# Patient Record
Sex: FEMALE | Race: BLACK OR AFRICAN AMERICAN | NOT HISPANIC OR LATINO | URBAN - METROPOLITAN AREA
[De-identification: names, ages, dates, MRNs, and addresses within clinical notes are randomized per-mention and may not be internally consistent; named-entity substitution may affect disease eponyms.]

---

## 2023-11-01 ENCOUNTER — OUTPATIENT (OUTPATIENT)
Dept: OUTPATIENT SERVICES | Facility: HOSPITAL | Age: 38
LOS: 1 days | End: 2023-11-01
Payer: MEDICAID

## 2023-11-01 VITALS
HEART RATE: 93 BPM | TEMPERATURE: 99 F | OXYGEN SATURATION: 97 % | RESPIRATION RATE: 18 BRPM | DIASTOLIC BLOOD PRESSURE: 71 MMHG | SYSTOLIC BLOOD PRESSURE: 124 MMHG

## 2023-11-01 VITALS — SYSTOLIC BLOOD PRESSURE: 118 MMHG | DIASTOLIC BLOOD PRESSURE: 70 MMHG | HEART RATE: 76 BPM

## 2023-11-01 DIAGNOSIS — O26.899 OTHER SPECIFIED PREGNANCY RELATED CONDITIONS, UNSPECIFIED TRIMESTER: ICD-10-CM

## 2023-11-01 DIAGNOSIS — Z98.890 OTHER SPECIFIED POSTPROCEDURAL STATES: Chronic | ICD-10-CM

## 2023-11-01 LAB
ALBUMIN SERPL ELPH-MCNC: 3.6 G/DL — SIGNIFICANT CHANGE UP (ref 3.3–5)
ALBUMIN SERPL ELPH-MCNC: 3.6 G/DL — SIGNIFICANT CHANGE UP (ref 3.3–5)
ALP SERPL-CCNC: 135 U/L — HIGH (ref 40–120)
ALP SERPL-CCNC: 135 U/L — HIGH (ref 40–120)
ALT FLD-CCNC: 30 U/L — SIGNIFICANT CHANGE UP (ref 10–45)
ALT FLD-CCNC: 30 U/L — SIGNIFICANT CHANGE UP (ref 10–45)
ANION GAP SERPL CALC-SCNC: 13 MMOL/L — SIGNIFICANT CHANGE UP (ref 5–17)
ANION GAP SERPL CALC-SCNC: 13 MMOL/L — SIGNIFICANT CHANGE UP (ref 5–17)
APPEARANCE UR: CLEAR — SIGNIFICANT CHANGE UP
APPEARANCE UR: CLEAR — SIGNIFICANT CHANGE UP
AST SERPL-CCNC: 20 U/L — SIGNIFICANT CHANGE UP (ref 10–40)
AST SERPL-CCNC: 20 U/L — SIGNIFICANT CHANGE UP (ref 10–40)
BACTERIA # UR AUTO: NEGATIVE /HPF — SIGNIFICANT CHANGE UP
BACTERIA # UR AUTO: NEGATIVE /HPF — SIGNIFICANT CHANGE UP
BASOPHILS # BLD AUTO: 0.03 K/UL — SIGNIFICANT CHANGE UP (ref 0–0.2)
BASOPHILS # BLD AUTO: 0.03 K/UL — SIGNIFICANT CHANGE UP (ref 0–0.2)
BASOPHILS NFR BLD AUTO: 0.2 % — SIGNIFICANT CHANGE UP (ref 0–2)
BASOPHILS NFR BLD AUTO: 0.2 % — SIGNIFICANT CHANGE UP (ref 0–2)
BILIRUB SERPL-MCNC: <0.1 MG/DL — LOW (ref 0.2–1.2)
BILIRUB SERPL-MCNC: <0.1 MG/DL — LOW (ref 0.2–1.2)
BILIRUB UR-MCNC: NEGATIVE — SIGNIFICANT CHANGE UP
BILIRUB UR-MCNC: NEGATIVE — SIGNIFICANT CHANGE UP
BLD GP AB SCN SERPL QL: NEGATIVE — SIGNIFICANT CHANGE UP
BLD GP AB SCN SERPL QL: NEGATIVE — SIGNIFICANT CHANGE UP
BUN SERPL-MCNC: 6 MG/DL — LOW (ref 7–23)
BUN SERPL-MCNC: 6 MG/DL — LOW (ref 7–23)
CALCIUM SERPL-MCNC: 8.8 MG/DL — SIGNIFICANT CHANGE UP (ref 8.4–10.5)
CALCIUM SERPL-MCNC: 8.8 MG/DL — SIGNIFICANT CHANGE UP (ref 8.4–10.5)
CAST: 0 /LPF — SIGNIFICANT CHANGE UP (ref 0–4)
CAST: 0 /LPF — SIGNIFICANT CHANGE UP (ref 0–4)
CHLORIDE SERPL-SCNC: 107 MMOL/L — SIGNIFICANT CHANGE UP (ref 96–108)
CHLORIDE SERPL-SCNC: 107 MMOL/L — SIGNIFICANT CHANGE UP (ref 96–108)
CO2 SERPL-SCNC: 17 MMOL/L — LOW (ref 22–31)
CO2 SERPL-SCNC: 17 MMOL/L — LOW (ref 22–31)
COLOR SPEC: YELLOW — SIGNIFICANT CHANGE UP
COLOR SPEC: YELLOW — SIGNIFICANT CHANGE UP
CREAT SERPL-MCNC: 0.48 MG/DL — LOW (ref 0.5–1.3)
CREAT SERPL-MCNC: 0.48 MG/DL — LOW (ref 0.5–1.3)
DIFF PNL FLD: NEGATIVE — SIGNIFICANT CHANGE UP
DIFF PNL FLD: NEGATIVE — SIGNIFICANT CHANGE UP
EGFR: 125 ML/MIN/1.73M2 — SIGNIFICANT CHANGE UP
EGFR: 125 ML/MIN/1.73M2 — SIGNIFICANT CHANGE UP
EOSINOPHIL # BLD AUTO: 0.02 K/UL — SIGNIFICANT CHANGE UP (ref 0–0.5)
EOSINOPHIL # BLD AUTO: 0.02 K/UL — SIGNIFICANT CHANGE UP (ref 0–0.5)
EOSINOPHIL NFR BLD AUTO: 0.1 % — SIGNIFICANT CHANGE UP (ref 0–6)
EOSINOPHIL NFR BLD AUTO: 0.1 % — SIGNIFICANT CHANGE UP (ref 0–6)
GLUCOSE SERPL-MCNC: 68 MG/DL — LOW (ref 70–99)
GLUCOSE SERPL-MCNC: 68 MG/DL — LOW (ref 70–99)
GLUCOSE UR QL: NEGATIVE MG/DL — SIGNIFICANT CHANGE UP
GLUCOSE UR QL: NEGATIVE MG/DL — SIGNIFICANT CHANGE UP
HCT VFR BLD CALC: 37.6 % — SIGNIFICANT CHANGE UP (ref 34.5–45)
HCT VFR BLD CALC: 37.6 % — SIGNIFICANT CHANGE UP (ref 34.5–45)
HGB BLD-MCNC: 12.7 G/DL — SIGNIFICANT CHANGE UP (ref 11.5–15.5)
HGB BLD-MCNC: 12.7 G/DL — SIGNIFICANT CHANGE UP (ref 11.5–15.5)
IMM GRANULOCYTES NFR BLD AUTO: 1.8 % — HIGH (ref 0–0.9)
IMM GRANULOCYTES NFR BLD AUTO: 1.8 % — HIGH (ref 0–0.9)
KETONES UR-MCNC: NEGATIVE MG/DL — SIGNIFICANT CHANGE UP
KETONES UR-MCNC: NEGATIVE MG/DL — SIGNIFICANT CHANGE UP
LEUKOCYTE ESTERASE UR-ACNC: ABNORMAL
LEUKOCYTE ESTERASE UR-ACNC: ABNORMAL
LYMPHOCYTES # BLD AUTO: 1.98 K/UL — SIGNIFICANT CHANGE UP (ref 1–3.3)
LYMPHOCYTES # BLD AUTO: 1.98 K/UL — SIGNIFICANT CHANGE UP (ref 1–3.3)
LYMPHOCYTES # BLD AUTO: 14.8 % — SIGNIFICANT CHANGE UP (ref 13–44)
LYMPHOCYTES # BLD AUTO: 14.8 % — SIGNIFICANT CHANGE UP (ref 13–44)
MCHC RBC-ENTMCNC: 26.7 PG — LOW (ref 27–34)
MCHC RBC-ENTMCNC: 26.7 PG — LOW (ref 27–34)
MCHC RBC-ENTMCNC: 33.8 GM/DL — SIGNIFICANT CHANGE UP (ref 32–36)
MCHC RBC-ENTMCNC: 33.8 GM/DL — SIGNIFICANT CHANGE UP (ref 32–36)
MCV RBC AUTO: 79.2 FL — LOW (ref 80–100)
MCV RBC AUTO: 79.2 FL — LOW (ref 80–100)
MONOCYTES # BLD AUTO: 0.9 K/UL — SIGNIFICANT CHANGE UP (ref 0–0.9)
MONOCYTES # BLD AUTO: 0.9 K/UL — SIGNIFICANT CHANGE UP (ref 0–0.9)
MONOCYTES NFR BLD AUTO: 6.7 % — SIGNIFICANT CHANGE UP (ref 2–14)
MONOCYTES NFR BLD AUTO: 6.7 % — SIGNIFICANT CHANGE UP (ref 2–14)
NEUTROPHILS # BLD AUTO: 10.19 K/UL — HIGH (ref 1.8–7.4)
NEUTROPHILS # BLD AUTO: 10.19 K/UL — HIGH (ref 1.8–7.4)
NEUTROPHILS NFR BLD AUTO: 76.4 % — SIGNIFICANT CHANGE UP (ref 43–77)
NEUTROPHILS NFR BLD AUTO: 76.4 % — SIGNIFICANT CHANGE UP (ref 43–77)
NITRITE UR-MCNC: NEGATIVE — SIGNIFICANT CHANGE UP
NITRITE UR-MCNC: NEGATIVE — SIGNIFICANT CHANGE UP
NRBC # BLD: 0 /100 WBCS — SIGNIFICANT CHANGE UP (ref 0–0)
NRBC # BLD: 0 /100 WBCS — SIGNIFICANT CHANGE UP (ref 0–0)
PH UR: 6.5 — SIGNIFICANT CHANGE UP (ref 5–8)
PH UR: 6.5 — SIGNIFICANT CHANGE UP (ref 5–8)
PLATELET # BLD AUTO: 232 K/UL — SIGNIFICANT CHANGE UP (ref 150–400)
PLATELET # BLD AUTO: 232 K/UL — SIGNIFICANT CHANGE UP (ref 150–400)
POTASSIUM SERPL-MCNC: 3.8 MMOL/L — SIGNIFICANT CHANGE UP (ref 3.5–5.3)
POTASSIUM SERPL-MCNC: 3.8 MMOL/L — SIGNIFICANT CHANGE UP (ref 3.5–5.3)
POTASSIUM SERPL-SCNC: 3.8 MMOL/L — SIGNIFICANT CHANGE UP (ref 3.5–5.3)
POTASSIUM SERPL-SCNC: 3.8 MMOL/L — SIGNIFICANT CHANGE UP (ref 3.5–5.3)
PROT SERPL-MCNC: 6.3 G/DL — SIGNIFICANT CHANGE UP (ref 6–8.3)
PROT SERPL-MCNC: 6.3 G/DL — SIGNIFICANT CHANGE UP (ref 6–8.3)
PROT UR-MCNC: NEGATIVE MG/DL — SIGNIFICANT CHANGE UP
PROT UR-MCNC: NEGATIVE MG/DL — SIGNIFICANT CHANGE UP
RBC # BLD: 4.75 M/UL — SIGNIFICANT CHANGE UP (ref 3.8–5.2)
RBC # BLD: 4.75 M/UL — SIGNIFICANT CHANGE UP (ref 3.8–5.2)
RBC # FLD: 15.6 % — HIGH (ref 10.3–14.5)
RBC # FLD: 15.6 % — HIGH (ref 10.3–14.5)
RBC CASTS # UR COMP ASSIST: 2 /HPF — SIGNIFICANT CHANGE UP (ref 0–4)
RBC CASTS # UR COMP ASSIST: 2 /HPF — SIGNIFICANT CHANGE UP (ref 0–4)
RH IG SCN BLD-IMP: POSITIVE — SIGNIFICANT CHANGE UP
SODIUM SERPL-SCNC: 137 MMOL/L — SIGNIFICANT CHANGE UP (ref 135–145)
SODIUM SERPL-SCNC: 137 MMOL/L — SIGNIFICANT CHANGE UP (ref 135–145)
SP GR SPEC: 1.01 — SIGNIFICANT CHANGE UP (ref 1–1.03)
SP GR SPEC: 1.01 — SIGNIFICANT CHANGE UP (ref 1–1.03)
SQUAMOUS # UR AUTO: 1 /HPF — SIGNIFICANT CHANGE UP (ref 0–5)
SQUAMOUS # UR AUTO: 1 /HPF — SIGNIFICANT CHANGE UP (ref 0–5)
UROBILINOGEN FLD QL: 0.2 MG/DL — SIGNIFICANT CHANGE UP (ref 0.2–1)
UROBILINOGEN FLD QL: 0.2 MG/DL — SIGNIFICANT CHANGE UP (ref 0.2–1)
WBC # BLD: 13.36 K/UL — HIGH (ref 3.8–10.5)
WBC # BLD: 13.36 K/UL — HIGH (ref 3.8–10.5)
WBC # FLD AUTO: 13.36 K/UL — HIGH (ref 3.8–10.5)
WBC # FLD AUTO: 13.36 K/UL — HIGH (ref 3.8–10.5)
WBC UR QL: 2 /HPF — SIGNIFICANT CHANGE UP (ref 0–5)
WBC UR QL: 2 /HPF — SIGNIFICANT CHANGE UP (ref 0–5)

## 2023-11-01 PROCEDURE — 87491 CHLMYD TRACH DNA AMP PROBE: CPT

## 2023-11-01 PROCEDURE — 86787 VARICELLA-ZOSTER ANTIBODY: CPT

## 2023-11-01 PROCEDURE — 81001 URINALYSIS AUTO W/SCOPE: CPT

## 2023-11-01 PROCEDURE — 87086 URINE CULTURE/COLONY COUNT: CPT

## 2023-11-01 PROCEDURE — G0463: CPT

## 2023-11-01 PROCEDURE — 80053 COMPREHEN METABOLIC PANEL: CPT

## 2023-11-01 PROCEDURE — 99223 1ST HOSP IP/OBS HIGH 75: CPT

## 2023-11-01 PROCEDURE — 85025 COMPLETE CBC W/AUTO DIFF WBC: CPT

## 2023-11-01 PROCEDURE — 86900 BLOOD TYPING SEROLOGIC ABO: CPT

## 2023-11-01 PROCEDURE — 86901 BLOOD TYPING SEROLOGIC RH(D): CPT

## 2023-11-01 PROCEDURE — 86850 RBC ANTIBODY SCREEN: CPT

## 2023-11-01 PROCEDURE — 87591 N.GONORRHOEAE DNA AMP PROB: CPT

## 2023-11-01 PROCEDURE — 87653 STREP B DNA AMP PROBE: CPT

## 2023-11-01 PROCEDURE — 86780 TREPONEMA PALLIDUM: CPT

## 2023-11-01 PROCEDURE — 87389 HIV-1 AG W/HIV-1&-2 AB AG IA: CPT

## 2023-11-01 NOTE — OB PROVIDER TRIAGE NOTE - NSHPPHYSICALEXAM_GEN_ALL_CORE
ICU Vital Signs Last 24 Hrs  T(C): 37.1 (01 Nov 2023 20:49), Max: 37.1 (01 Nov 2023 20:43)  T(F): 98.78 (01 Nov 2023 20:49), Max: 98.8 (01 Nov 2023 20:43)  HR: 84 (01 Nov 2023 21:52) (82 - 106)  BP: 124/71 (01 Nov 2023 20:49) (124/71 - 124/71)  BP(mean): --  ABP: --  ABP(mean): --  RR: 18 (01 Nov 2023 20:49) (18 - 18)  SpO2: 98% (01 Nov 2023 21:52) (96% - 99%)    O2 Parameters below as of 01 Nov 2023 20:43  Patient On (Oxygen Delivery Method): room air        Gen: NAD  Abd: soft, nontender  SSE: Cervix visually closed, no leakage or membranes or bleeding noted. Tompkins cerclage suture/knots visualized at 12 o'clock  TVUS: CL 3.2, cerclage visualized in cervical stroma   TAUS: Vertex, right lateral placenta, MVP 5, BPP 8/8

## 2023-11-01 NOTE — OB PROVIDER TRIAGE NOTE - NSOBPROVIDERNOTE_OBGYN_ALL_OB_FT
39yo  at 31w presenting for pelvic pressure and cramping with history-indicated Tompkins cerclage in situ. Cerclage noted to be in place, cervix long, no evidence of PTL at this time.    - prenatal labs drawn  - NST reactive, appropriate for gestational age, intermittent CTX noted  - Fetal and maternal status reassuring    Seen and d/w Dr. Ronnie Adame, PGY3

## 2023-11-01 NOTE — OB PROVIDER TRIAGE NOTE - HISTORY OF PRESENT ILLNESS
38y  at 31w presenting with cramping and pelvic pressure. Denies vaginal bleeding and loss of fluid. Normal fetal movement.    Patient has recently relocated from [place], where she had a history-indicated cerclage (Felicitas) placed at 14w. Pregnancy has been otherwise uncomplicated.     ObHx:    demise at 20+ weeks 2/2 painless cervical dilation s/p , required D&C for PPH, offered transfusion but refused     GynHx:   denies, cannot recall last pap results    MedHx: denies    SrgHx: D&C    PsychHx: denies    SocialHx: denies    AllergyHx: denies    RxHx: denies

## 2023-11-01 NOTE — OB PROVIDER TRIAGE NOTE - ATTENDING COMMENTS
ATTG note    Pt seen with and agree with above PGY3 note    37 yo  @ 31 wks with h/o hx-indicated cerclage now c/o pelvic pressure and cramping since today.  Denies LOF/VB.  PNC-started in [place], 14 wk cerclage placed for h/o 20 wk painless dilation and BBOW with IOL and , c/b retained POC and PPH  late to care in [facility] and started at [facility] with 2 visits  Desires to transfer care to [facility], has records with first trimester dating and anatomy u/s    VSS, afebrile  Gen-NAD  Reative NST  Irreg ctxs  SSE-os closed, stitch seen  CL-3.3 cm  BSUS-vtx, fundal plaenta, MVP-4, BPP-8/8    u/a - normal    37 yo  @ 31 wks with h/o cervical insuff and hx-indicated cerclage now for cramping.  No signs of PTL.  Cerclage in situ.  Reactive NST.  -clinically stable to dc to home  -Email and contact info for [address] [place] [address] to establish care and instructed to bring records  -Return precautions given    YISSEL Trujillo

## 2023-11-02 ENCOUNTER — NON-APPOINTMENT (OUTPATIENT)
Age: 38
End: 2023-11-02

## 2023-11-02 DIAGNOSIS — R10.2 PELVIC AND PERINEAL PAIN: ICD-10-CM

## 2023-11-02 DIAGNOSIS — Z87.59 PERSONAL HISTORY OF OTHER COMPLICATIONS OF PREGNANCY, CHILDBIRTH AND THE PUERPERIUM: ICD-10-CM

## 2023-11-02 DIAGNOSIS — Z3A.31 31 WEEKS GESTATION OF PREGNANCY: ICD-10-CM

## 2023-11-02 DIAGNOSIS — O09.523 SUPERVISION OF ELDERLY MULTIGRAVIDA, THIRD TRIMESTER: ICD-10-CM

## 2023-11-02 DIAGNOSIS — O26.893 OTHER SPECIFIED PREGNANCY RELATED CONDITIONS, THIRD TRIMESTER: ICD-10-CM

## 2023-11-02 DIAGNOSIS — O34.33 MATERNAL CARE FOR CERVICAL INCOMPETENCE, THIRD TRIMESTER: ICD-10-CM

## 2023-11-02 PROBLEM — Z00.00 ENCOUNTER FOR PREVENTIVE HEALTH EXAMINATION: Status: ACTIVE | Noted: 2023-11-02

## 2023-11-02 LAB
C TRACH RRNA SPEC QL NAA+PROBE: SIGNIFICANT CHANGE UP
C TRACH RRNA SPEC QL NAA+PROBE: SIGNIFICANT CHANGE UP
CULTURE RESULTS: SIGNIFICANT CHANGE UP
CULTURE RESULTS: SIGNIFICANT CHANGE UP
HIV 1+2 AB+HIV1 P24 AG SERPL QL IA: SIGNIFICANT CHANGE UP
HIV 1+2 AB+HIV1 P24 AG SERPL QL IA: SIGNIFICANT CHANGE UP
N GONORRHOEA RRNA SPEC QL NAA+PROBE: SIGNIFICANT CHANGE UP
N GONORRHOEA RRNA SPEC QL NAA+PROBE: SIGNIFICANT CHANGE UP
SPECIMEN SOURCE: SIGNIFICANT CHANGE UP
SPECIMEN SOURCE: SIGNIFICANT CHANGE UP
T PALLIDUM AB TITR SER: NEGATIVE — SIGNIFICANT CHANGE UP
T PALLIDUM AB TITR SER: NEGATIVE — SIGNIFICANT CHANGE UP
VZV IGG FLD QL IA: 950.9 INDEX — SIGNIFICANT CHANGE UP
VZV IGG FLD QL IA: 950.9 INDEX — SIGNIFICANT CHANGE UP
VZV IGG FLD QL IA: POSITIVE — SIGNIFICANT CHANGE UP
VZV IGG FLD QL IA: POSITIVE — SIGNIFICANT CHANGE UP

## 2023-11-03 DIAGNOSIS — Z82.49 FAMILY HISTORY OF ISCHEMIC HEART DISEASE AND OTHER DISEASES OF THE CIRCULATORY SYSTEM: ICD-10-CM

## 2023-11-03 DIAGNOSIS — Z78.9 OTHER SPECIFIED HEALTH STATUS: ICD-10-CM

## 2023-11-03 DIAGNOSIS — Z83.3 FAMILY HISTORY OF DIABETES MELLITUS: ICD-10-CM

## 2023-11-03 DIAGNOSIS — O09.299 SUPERVISION OF PREGNANCY WITH OTHER POOR REPRODUCTIVE OR OBSTETRIC HISTORY, UNSPECIFIED TRIMESTER: ICD-10-CM

## 2023-11-03 DIAGNOSIS — Z86.2 PERSONAL HISTORY OF DISEASES OF THE BLOOD AND BLOOD-FORMING ORGANS AND CERTAIN DISORDERS INVOLVING THE IMMUNE MECHANISM: ICD-10-CM

## 2023-11-03 DIAGNOSIS — O34.30 MATERNAL CARE FOR CERVICAL INCOMPETENCE, UNSPECIFIED TRIMESTER: ICD-10-CM

## 2023-11-03 DIAGNOSIS — Z80.42 FAMILY HISTORY OF MALIGNANT NEOPLASM OF PROSTATE: ICD-10-CM

## 2023-11-03 LAB
GROUP B BETA STREP DNA (PCR): DETECTED
GROUP B BETA STREP DNA (PCR): DETECTED
SOURCE GROUP B STREP: SIGNIFICANT CHANGE UP
SOURCE GROUP B STREP: SIGNIFICANT CHANGE UP

## 2023-11-08 ENCOUNTER — NON-APPOINTMENT (OUTPATIENT)
Age: 38
End: 2023-11-08

## 2023-11-14 ENCOUNTER — OUTPATIENT (OUTPATIENT)
Dept: OUTPATIENT SERVICES | Facility: HOSPITAL | Age: 38
LOS: 1 days | End: 2023-11-14
Payer: MEDICAID

## 2023-11-14 ENCOUNTER — APPOINTMENT (OUTPATIENT)
Dept: ANTEPARTUM | Facility: CLINIC | Age: 38
End: 2023-11-14
Payer: MEDICAID

## 2023-11-14 ENCOUNTER — APPOINTMENT (OUTPATIENT)
Dept: MATERNAL FETAL MEDICINE | Facility: CLINIC | Age: 38
End: 2023-11-14
Payer: MEDICAID

## 2023-11-14 ENCOUNTER — ASOB RESULT (OUTPATIENT)
Age: 38
End: 2023-11-14

## 2023-11-14 VITALS
WEIGHT: 157 LBS | SYSTOLIC BLOOD PRESSURE: 118 MMHG | HEART RATE: 96 BPM | OXYGEN SATURATION: 98 % | DIASTOLIC BLOOD PRESSURE: 62 MMHG

## 2023-11-14 DIAGNOSIS — O09.899 SUPERVISION OF OTHER HIGH RISK PREGNANCIES, UNSPECIFIED TRIMESTER: ICD-10-CM

## 2023-11-14 DIAGNOSIS — O99.619 DISEASES OF THE DIGESTIVE SYSTEM COMPLICATING PREGNANCY, UNSPECIFIED TRIMESTER: ICD-10-CM

## 2023-11-14 DIAGNOSIS — O09.90 SUPERVISION OF HIGH RISK PREGNANCY, UNSPECIFIED, UNSPECIFIED TRIMESTER: ICD-10-CM

## 2023-11-14 DIAGNOSIS — Z98.890 OTHER SPECIFIED POSTPROCEDURAL STATES: Chronic | ICD-10-CM

## 2023-11-14 DIAGNOSIS — K21.9 DISEASES OF THE DIGESTIVE SYSTEM COMPLICATING PREGNANCY, UNSPECIFIED TRIMESTER: ICD-10-CM

## 2023-11-14 LAB
BILIRUB UR QL STRIP: NORMAL
COLLECTION METHOD: NORMAL
GLUCOSE UR-MCNC: NORMAL
HCG UR QL: 0.2 EU/DL
HCT VFR BLD CALC: 44.2 % — SIGNIFICANT CHANGE UP (ref 34.5–45)
HCT VFR BLD CALC: 44.2 % — SIGNIFICANT CHANGE UP (ref 34.5–45)
HGB BLD-MCNC: 14.2 G/DL — SIGNIFICANT CHANGE UP (ref 11.5–15.5)
HGB BLD-MCNC: 14.2 G/DL — SIGNIFICANT CHANGE UP (ref 11.5–15.5)
HGB UR QL STRIP.AUTO: NORMAL
KETONES UR-MCNC: NORMAL
LEUKOCYTE ESTERASE UR QL STRIP: NORMAL
MCHC RBC-ENTMCNC: 26.6 PG — LOW (ref 27–34)
MCHC RBC-ENTMCNC: 26.6 PG — LOW (ref 27–34)
MCHC RBC-ENTMCNC: 32.1 GM/DL — SIGNIFICANT CHANGE UP (ref 32–36)
MCHC RBC-ENTMCNC: 32.1 GM/DL — SIGNIFICANT CHANGE UP (ref 32–36)
MCV RBC AUTO: 82.8 FL — SIGNIFICANT CHANGE UP (ref 80–100)
MCV RBC AUTO: 82.8 FL — SIGNIFICANT CHANGE UP (ref 80–100)
NITRITE UR QL STRIP: NORMAL
PH UR STRIP: 6
PLATELET # BLD AUTO: 244 K/UL — SIGNIFICANT CHANGE UP (ref 150–400)
PLATELET # BLD AUTO: 244 K/UL — SIGNIFICANT CHANGE UP (ref 150–400)
PROT UR STRIP-MCNC: NORMAL
RBC # BLD: 5.34 M/UL — HIGH (ref 3.8–5.2)
RBC # BLD: 5.34 M/UL — HIGH (ref 3.8–5.2)
RBC # FLD: 16.3 % — HIGH (ref 10.3–14.5)
RBC # FLD: 16.3 % — HIGH (ref 10.3–14.5)
SP GR UR STRIP: 1.02
TSH SERPL-MCNC: 1.96 UIU/ML — SIGNIFICANT CHANGE UP (ref 0.27–4.2)
TSH SERPL-MCNC: 1.96 UIU/ML — SIGNIFICANT CHANGE UP (ref 0.27–4.2)
WBC # BLD: 12.14 K/UL — HIGH (ref 3.8–10.5)
WBC # BLD: 12.14 K/UL — HIGH (ref 3.8–10.5)
WBC # FLD AUTO: 12.14 K/UL — HIGH (ref 3.8–10.5)
WBC # FLD AUTO: 12.14 K/UL — HIGH (ref 3.8–10.5)

## 2023-11-14 PROCEDURE — 85027 COMPLETE CBC AUTOMATED: CPT

## 2023-11-14 PROCEDURE — 36415 COLL VENOUS BLD VENIPUNCTURE: CPT

## 2023-11-14 PROCEDURE — 86850 RBC ANTIBODY SCREEN: CPT

## 2023-11-14 PROCEDURE — 86901 BLOOD TYPING SEROLOGIC RH(D): CPT

## 2023-11-14 PROCEDURE — 87389 HIV-1 AG W/HIV-1&-2 AB AG IA: CPT

## 2023-11-14 PROCEDURE — 76805 OB US >/= 14 WKS SNGL FETUS: CPT | Mod: 26

## 2023-11-14 PROCEDURE — G0463: CPT

## 2023-11-14 PROCEDURE — 86706 HEP B SURFACE ANTIBODY: CPT

## 2023-11-14 PROCEDURE — 99203 OFFICE O/P NEW LOW 30 MIN: CPT | Mod: TH,GE,25

## 2023-11-14 PROCEDURE — 86780 TREPONEMA PALLIDUM: CPT

## 2023-11-14 PROCEDURE — 87086 URINE CULTURE/COLONY COUNT: CPT

## 2023-11-14 PROCEDURE — 81003 URINALYSIS AUTO W/O SCOPE: CPT

## 2023-11-14 PROCEDURE — 86900 BLOOD TYPING SEROLOGIC ABO: CPT

## 2023-11-14 PROCEDURE — 84443 ASSAY THYROID STIM HORMONE: CPT

## 2023-11-15 ENCOUNTER — LABORATORY RESULT (OUTPATIENT)
Age: 38
End: 2023-11-15

## 2023-11-15 LAB
HBV SURFACE AB SER-ACNC: REACTIVE
HBV SURFACE AB SER-ACNC: REACTIVE
HIV 1+2 AB+HIV1 P24 AG SERPL QL IA: SIGNIFICANT CHANGE UP
HIV 1+2 AB+HIV1 P24 AG SERPL QL IA: SIGNIFICANT CHANGE UP
T PALLIDUM AB TITR SER: NEGATIVE — SIGNIFICANT CHANGE UP
T PALLIDUM AB TITR SER: NEGATIVE — SIGNIFICANT CHANGE UP

## 2023-11-16 ENCOUNTER — NON-APPOINTMENT (OUTPATIENT)
Age: 38
End: 2023-11-16

## 2023-11-16 PROBLEM — O99.619 GASTROESOPHAGEAL REFLUX DURING PREGNANCY, ANTEPARTUM: Status: ACTIVE | Noted: 2023-11-16

## 2023-11-16 PROBLEM — O09.90 HIGH RISK PREGNANCY, ANTEPARTUM: Status: ACTIVE | Noted: 2023-11-16

## 2023-11-16 LAB
CULTURE RESULTS: SIGNIFICANT CHANGE UP
CULTURE RESULTS: SIGNIFICANT CHANGE UP
SPECIMEN SOURCE: SIGNIFICANT CHANGE UP
SPECIMEN SOURCE: SIGNIFICANT CHANGE UP

## 2023-11-16 RX ORDER — PNV NO.95/FERROUS FUM/FOLIC AC 28MG-0.8MG
28-0.8 TABLET ORAL
Qty: 100 | Refills: 1 | Status: ACTIVE | COMMUNITY
Start: 2023-11-16 | End: 1900-01-01

## 2023-11-16 RX ORDER — FAMOTIDINE 20 MG/1
20 TABLET, FILM COATED ORAL
Qty: 1 | Refills: 1 | Status: ACTIVE | COMMUNITY
Start: 2023-11-16 | End: 1900-01-01

## 2023-11-20 ENCOUNTER — NON-APPOINTMENT (OUTPATIENT)
Age: 38
End: 2023-11-20

## 2023-11-20 DIAGNOSIS — Z22.330 CARRIER OF GROUP B STREPTOCOCCUS: ICD-10-CM

## 2023-11-20 RX ORDER — CALCIUM CARBONATE/VITAMIN D3 600MG-5MCG
600-5 TABLET ORAL
Qty: 50 | Refills: 1 | Status: ACTIVE | COMMUNITY
Start: 2023-11-20 | End: 1900-01-01

## 2023-11-23 ENCOUNTER — INPATIENT (INPATIENT)
Facility: HOSPITAL | Age: 38
LOS: 1 days | Discharge: HOME | End: 2023-11-25
Attending: OBSTETRICS & GYNECOLOGY | Admitting: OBSTETRICS & GYNECOLOGY
Payer: MEDICAID

## 2023-11-23 VITALS
OXYGEN SATURATION: 98 % | RESPIRATION RATE: 18 BRPM | SYSTOLIC BLOOD PRESSURE: 133 MMHG | TEMPERATURE: 99 F | DIASTOLIC BLOOD PRESSURE: 87 MMHG | HEART RATE: 101 BPM

## 2023-11-23 DIAGNOSIS — O26.899 OTHER SPECIFIED PREGNANCY RELATED CONDITIONS, UNSPECIFIED TRIMESTER: ICD-10-CM

## 2023-11-23 DIAGNOSIS — Z98.890 OTHER SPECIFIED POSTPROCEDURAL STATES: Chronic | ICD-10-CM

## 2023-11-23 DIAGNOSIS — Z34.80 ENCOUNTER FOR SUPERVISION OF OTHER NORMAL PREGNANCY, UNSPECIFIED TRIMESTER: ICD-10-CM

## 2023-11-23 LAB
BASOPHILS # BLD AUTO: 0.05 K/UL — SIGNIFICANT CHANGE UP (ref 0–0.2)
BASOPHILS # BLD AUTO: 0.05 K/UL — SIGNIFICANT CHANGE UP (ref 0–0.2)
BASOPHILS NFR BLD AUTO: 0.3 % — SIGNIFICANT CHANGE UP (ref 0–2)
BASOPHILS NFR BLD AUTO: 0.3 % — SIGNIFICANT CHANGE UP (ref 0–2)
BLD GP AB SCN SERPL QL: NEGATIVE — SIGNIFICANT CHANGE UP
BLD GP AB SCN SERPL QL: NEGATIVE — SIGNIFICANT CHANGE UP
EOSINOPHIL # BLD AUTO: 0.06 K/UL — SIGNIFICANT CHANGE UP (ref 0–0.5)
EOSINOPHIL # BLD AUTO: 0.06 K/UL — SIGNIFICANT CHANGE UP (ref 0–0.5)
EOSINOPHIL NFR BLD AUTO: 0.4 % — SIGNIFICANT CHANGE UP (ref 0–6)
EOSINOPHIL NFR BLD AUTO: 0.4 % — SIGNIFICANT CHANGE UP (ref 0–6)
HCT VFR BLD CALC: 41.9 % — SIGNIFICANT CHANGE UP (ref 34.5–45)
HCT VFR BLD CALC: 41.9 % — SIGNIFICANT CHANGE UP (ref 34.5–45)
HGB BLD-MCNC: 14.3 G/DL — SIGNIFICANT CHANGE UP (ref 11.5–15.5)
HGB BLD-MCNC: 14.3 G/DL — SIGNIFICANT CHANGE UP (ref 11.5–15.5)
IMM GRANULOCYTES NFR BLD AUTO: 1.7 % — HIGH (ref 0–0.9)
IMM GRANULOCYTES NFR BLD AUTO: 1.7 % — HIGH (ref 0–0.9)
LYMPHOCYTES # BLD AUTO: 1.84 K/UL — SIGNIFICANT CHANGE UP (ref 1–3.3)
LYMPHOCYTES # BLD AUTO: 1.84 K/UL — SIGNIFICANT CHANGE UP (ref 1–3.3)
LYMPHOCYTES # BLD AUTO: 12.8 % — LOW (ref 13–44)
LYMPHOCYTES # BLD AUTO: 12.8 % — LOW (ref 13–44)
MCHC RBC-ENTMCNC: 27 PG — SIGNIFICANT CHANGE UP (ref 27–34)
MCHC RBC-ENTMCNC: 27 PG — SIGNIFICANT CHANGE UP (ref 27–34)
MCHC RBC-ENTMCNC: 34.1 GM/DL — SIGNIFICANT CHANGE UP (ref 32–36)
MCHC RBC-ENTMCNC: 34.1 GM/DL — SIGNIFICANT CHANGE UP (ref 32–36)
MCV RBC AUTO: 79.2 FL — LOW (ref 80–100)
MCV RBC AUTO: 79.2 FL — LOW (ref 80–100)
MONOCYTES # BLD AUTO: 0.88 K/UL — SIGNIFICANT CHANGE UP (ref 0–0.9)
MONOCYTES # BLD AUTO: 0.88 K/UL — SIGNIFICANT CHANGE UP (ref 0–0.9)
MONOCYTES NFR BLD AUTO: 6.1 % — SIGNIFICANT CHANGE UP (ref 2–14)
MONOCYTES NFR BLD AUTO: 6.1 % — SIGNIFICANT CHANGE UP (ref 2–14)
NEUTROPHILS # BLD AUTO: 11.35 K/UL — HIGH (ref 1.8–7.4)
NEUTROPHILS # BLD AUTO: 11.35 K/UL — HIGH (ref 1.8–7.4)
NEUTROPHILS NFR BLD AUTO: 78.7 % — HIGH (ref 43–77)
NEUTROPHILS NFR BLD AUTO: 78.7 % — HIGH (ref 43–77)
NRBC # BLD: 0 /100 WBCS — SIGNIFICANT CHANGE UP (ref 0–0)
NRBC # BLD: 0 /100 WBCS — SIGNIFICANT CHANGE UP (ref 0–0)
PLATELET # BLD AUTO: 208 K/UL — SIGNIFICANT CHANGE UP (ref 150–400)
PLATELET # BLD AUTO: 208 K/UL — SIGNIFICANT CHANGE UP (ref 150–400)
RBC # BLD: 5.29 M/UL — HIGH (ref 3.8–5.2)
RBC # BLD: 5.29 M/UL — HIGH (ref 3.8–5.2)
RBC # FLD: 15.1 % — HIGH (ref 10.3–14.5)
RBC # FLD: 15.1 % — HIGH (ref 10.3–14.5)
RH IG SCN BLD-IMP: POSITIVE — SIGNIFICANT CHANGE UP
RH IG SCN BLD-IMP: POSITIVE — SIGNIFICANT CHANGE UP
WBC # BLD: 14.42 K/UL — HIGH (ref 3.8–10.5)
WBC # BLD: 14.42 K/UL — HIGH (ref 3.8–10.5)
WBC # FLD AUTO: 14.42 K/UL — HIGH (ref 3.8–10.5)
WBC # FLD AUTO: 14.42 K/UL — HIGH (ref 3.8–10.5)

## 2023-11-23 PROCEDURE — 88307 TISSUE EXAM BY PATHOLOGIST: CPT | Mod: 26

## 2023-11-23 PROCEDURE — 59409 OBSTETRICAL CARE: CPT | Mod: U7

## 2023-11-23 RX ORDER — SIMETHICONE 80 MG/1
80 TABLET, CHEWABLE ORAL EVERY 4 HOURS
Refills: 0 | Status: DISCONTINUED | OUTPATIENT
Start: 2023-11-23 | End: 2023-11-25

## 2023-11-23 RX ORDER — ONDANSETRON 8 MG/1
4 TABLET, FILM COATED ORAL ONCE
Refills: 0 | Status: DISCONTINUED | OUTPATIENT
Start: 2023-11-23 | End: 2023-11-25

## 2023-11-23 RX ORDER — SODIUM CHLORIDE 9 MG/ML
500 INJECTION INTRAMUSCULAR; INTRAVENOUS; SUBCUTANEOUS ONCE
Refills: 0 | Status: DISCONTINUED | OUTPATIENT
Start: 2023-11-23 | End: 2023-11-25

## 2023-11-23 RX ORDER — MAGNESIUM HYDROXIDE 400 MG/1
30 TABLET, CHEWABLE ORAL
Refills: 0 | Status: DISCONTINUED | OUTPATIENT
Start: 2023-11-23 | End: 2023-11-25

## 2023-11-23 RX ORDER — AMPICILLIN TRIHYDRATE 250 MG
2 CAPSULE ORAL ONCE
Refills: 0 | Status: COMPLETED | OUTPATIENT
Start: 2023-11-23 | End: 2023-11-23

## 2023-11-23 RX ORDER — OXYCODONE HYDROCHLORIDE 5 MG/1
5 TABLET ORAL ONCE
Refills: 0 | Status: DISCONTINUED | OUTPATIENT
Start: 2023-11-23 | End: 2023-11-25

## 2023-11-23 RX ORDER — OXYTOCIN 10 UNIT/ML
4 VIAL (ML) INJECTION
Qty: 30 | Refills: 0 | Status: DISCONTINUED | OUTPATIENT
Start: 2023-11-23 | End: 2023-11-23

## 2023-11-23 RX ORDER — SODIUM CHLORIDE 9 MG/ML
1000 INJECTION INTRAMUSCULAR; INTRAVENOUS; SUBCUTANEOUS
Refills: 0 | Status: DISCONTINUED | OUTPATIENT
Start: 2023-11-23 | End: 2023-11-25

## 2023-11-23 RX ORDER — INFLUENZA VIRUS VACCINE 15; 15; 15; 15 UG/.5ML; UG/.5ML; UG/.5ML; UG/.5ML
0.5 SUSPENSION INTRAMUSCULAR ONCE
Refills: 0 | Status: COMPLETED | OUTPATIENT
Start: 2023-11-23 | End: 2023-11-23

## 2023-11-23 RX ORDER — SODIUM CHLORIDE 9 MG/ML
3 INJECTION INTRAMUSCULAR; INTRAVENOUS; SUBCUTANEOUS EVERY 8 HOURS
Refills: 0 | Status: DISCONTINUED | OUTPATIENT
Start: 2023-11-23 | End: 2023-11-25

## 2023-11-23 RX ORDER — IBUPROFEN 200 MG
600 TABLET ORAL EVERY 6 HOURS
Refills: 0 | Status: COMPLETED | OUTPATIENT
Start: 2023-11-23 | End: 2024-10-21

## 2023-11-23 RX ORDER — LANOLIN
1 OINTMENT (GRAM) TOPICAL EVERY 6 HOURS
Refills: 0 | Status: DISCONTINUED | OUTPATIENT
Start: 2023-11-23 | End: 2023-11-25

## 2023-11-23 RX ORDER — SODIUM CHLORIDE 9 MG/ML
500 INJECTION, SOLUTION INTRAVENOUS ONCE
Refills: 0 | Status: DISCONTINUED | OUTPATIENT
Start: 2023-11-23 | End: 2023-11-23

## 2023-11-23 RX ORDER — BENZOCAINE 10 %
1 GEL (GRAM) MUCOUS MEMBRANE EVERY 6 HOURS
Refills: 0 | Status: DISCONTINUED | OUTPATIENT
Start: 2023-11-23 | End: 2023-11-25

## 2023-11-23 RX ORDER — CHLORHEXIDINE GLUCONATE 213 G/1000ML
1 SOLUTION TOPICAL DAILY
Refills: 0 | Status: DISCONTINUED | OUTPATIENT
Start: 2023-11-23 | End: 2023-11-23

## 2023-11-23 RX ORDER — AER TRAVELER 0.5 G/1
1 SOLUTION RECTAL; TOPICAL EVERY 4 HOURS
Refills: 0 | Status: DISCONTINUED | OUTPATIENT
Start: 2023-11-23 | End: 2023-11-25

## 2023-11-23 RX ORDER — TETANUS TOXOID, REDUCED DIPHTHERIA TOXOID AND ACELLULAR PERTUSSIS VACCINE, ADSORBED 5; 2.5; 8; 8; 2.5 [IU]/.5ML; [IU]/.5ML; UG/.5ML; UG/.5ML; UG/.5ML
0.5 SUSPENSION INTRAMUSCULAR ONCE
Refills: 0 | Status: DISCONTINUED | OUTPATIENT
Start: 2023-11-23 | End: 2023-11-25

## 2023-11-23 RX ORDER — KETOROLAC TROMETHAMINE 30 MG/ML
30 SYRINGE (ML) INJECTION ONCE
Refills: 0 | Status: DISCONTINUED | OUTPATIENT
Start: 2023-11-23 | End: 2023-11-23

## 2023-11-23 RX ORDER — HYDROCORTISONE 1 %
1 OINTMENT (GRAM) TOPICAL EVERY 6 HOURS
Refills: 0 | Status: DISCONTINUED | OUTPATIENT
Start: 2023-11-23 | End: 2023-11-25

## 2023-11-23 RX ORDER — ACETAMINOPHEN 500 MG
975 TABLET ORAL
Refills: 0 | Status: DISCONTINUED | OUTPATIENT
Start: 2023-11-23 | End: 2023-11-25

## 2023-11-23 RX ORDER — OXYCODONE HYDROCHLORIDE 5 MG/1
5 TABLET ORAL
Refills: 0 | Status: DISCONTINUED | OUTPATIENT
Start: 2023-11-23 | End: 2023-11-25

## 2023-11-23 RX ORDER — SODIUM CHLORIDE 9 MG/ML
1000 INJECTION, SOLUTION INTRAVENOUS
Refills: 0 | Status: DISCONTINUED | OUTPATIENT
Start: 2023-11-23 | End: 2023-11-23

## 2023-11-23 RX ORDER — AMPICILLIN TRIHYDRATE 250 MG
1 CAPSULE ORAL EVERY 4 HOURS
Refills: 0 | Status: DISCONTINUED | OUTPATIENT
Start: 2023-11-23 | End: 2023-11-23

## 2023-11-23 RX ORDER — DIBUCAINE 1 %
1 OINTMENT (GRAM) RECTAL EVERY 6 HOURS
Refills: 0 | Status: DISCONTINUED | OUTPATIENT
Start: 2023-11-23 | End: 2023-11-25

## 2023-11-23 RX ORDER — PRAMOXINE HYDROCHLORIDE 150 MG/15G
1 AEROSOL, FOAM RECTAL EVERY 4 HOURS
Refills: 0 | Status: DISCONTINUED | OUTPATIENT
Start: 2023-11-23 | End: 2023-11-25

## 2023-11-23 RX ORDER — OXYTOCIN 10 UNIT/ML
333.33 VIAL (ML) INJECTION
Qty: 20 | Refills: 0 | Status: DISCONTINUED | OUTPATIENT
Start: 2023-11-23 | End: 2023-11-23

## 2023-11-23 RX ORDER — OXYTOCIN 10 UNIT/ML
41.67 VIAL (ML) INJECTION
Qty: 20 | Refills: 0 | Status: DISCONTINUED | OUTPATIENT
Start: 2023-11-23 | End: 2023-11-25

## 2023-11-23 RX ORDER — DIPHENHYDRAMINE HCL 50 MG
25 CAPSULE ORAL EVERY 6 HOURS
Refills: 0 | Status: DISCONTINUED | OUTPATIENT
Start: 2023-11-23 | End: 2023-11-25

## 2023-11-23 RX ORDER — SODIUM CHLORIDE 9 MG/ML
500 INJECTION, SOLUTION INTRAVENOUS ONCE
Refills: 0 | Status: COMPLETED | OUTPATIENT
Start: 2023-11-23 | End: 2023-11-23

## 2023-11-23 RX ORDER — CITRIC ACID/SODIUM CITRATE 300-500 MG
15 SOLUTION, ORAL ORAL EVERY 6 HOURS
Refills: 0 | Status: DISCONTINUED | OUTPATIENT
Start: 2023-11-23 | End: 2023-11-23

## 2023-11-23 RX ADMIN — Medication 108 GRAM(S): at 16:10

## 2023-11-23 RX ADMIN — Medication 200 GRAM(S): at 08:00

## 2023-11-23 RX ADMIN — Medication 12 MILLIGRAM(S): at 08:05

## 2023-11-23 RX ADMIN — Medication 30 MILLIGRAM(S): at 22:29

## 2023-11-23 RX ADMIN — SODIUM CHLORIDE 500 MILLILITER(S): 9 INJECTION, SOLUTION INTRAVENOUS at 18:50

## 2023-11-23 RX ADMIN — SODIUM CHLORIDE 125 MILLILITER(S): 9 INJECTION, SOLUTION INTRAVENOUS at 07:57

## 2023-11-23 RX ADMIN — Medication 4 MILLIUNIT(S)/MIN: at 11:40

## 2023-11-23 RX ADMIN — Medication 0.25 MILLIGRAM(S): at 08:40

## 2023-11-23 RX ADMIN — Medication 1000 MILLIUNIT(S)/MIN: at 19:59

## 2023-11-23 RX ADMIN — Medication 108 GRAM(S): at 12:00

## 2023-11-23 RX ADMIN — Medication 0.25 MILLIGRAM(S): at 18:15

## 2023-11-23 NOTE — OB PROVIDER H&P - ATTENDING COMMENTS
OB attg note    37 yo  at 34+1 here for PPROM since 4am this AM and now painful contractions. Has hx indicated cerclage in situ for 20 wk loss s/p  with retained placenta requiring D&C, cerclage placed in [place]. Late registrant to [facility].   EFW 1973g 28%ile AC 55%. Vertex on sono. GBS pos. Discussed w/ patient recommendations for cerclage removal and expectant mgmt for now and NICU consult. FOr BMZ for fetal lung maturity. EPidural prn. Anticipate .    Shi SCHULTE

## 2023-11-23 NOTE — OB PROVIDER H&P - ASSESSMENT
A/P: Pt is a 39 yo  @34w1d presenting for with painful contractions and leakage of fluid.   - hx indicated cerclage placed this pregnancy, placed in [place] at 14w  - hx of previable  labor with ROM at 20w,  followed by fetal demise   - GBS positive     1. Admit to LND. Routine Labs. IVF  2. Expectant Management  3. Plan for cerclage removal   4. Fetus: Cat I tracing, Vertex, EFW 1973g. C/w EFM.  4. NICU consult   5. GBS positive, Ampicillin   6. Pain: IV pain meds/epidural PRN    Lo Hassan, PGY3  d/w Dr. Morales    A/P: Pt is a 37 yo  @34w1d presenting for with painful contractions and leakage of fluid.   - hx indicated cerclage placed this pregnancy, placed in [place] at 14w  - hx of previable  labor with ROM at 20w,  followed by fetal demise   - GBS positive     1. Admit to LND. Routine Labs. IVF  2. Expectant Management  3. Plan for cerclage removal   4. Fetus: Cat I tracing, Vertex, EFW 1973g. C/w EFM.  4. NICU consult   5. GBS positive, Ampicillin   6. Pain: IV pain meds/epidural PRN    Lo Hassan, PGY3  d/w Dr. Morales     ATTYO P0 with cerclage in-situ presents@34+wks GA with PPROM in early labor with Cat I FHR   Upon attempting to remove cerclage, the majority of the stitch is under the cervical mucosa and pt is armen painfully.  Will therefore give 1 shot of terb to break up cntrx and give pt epidural anesthesia for comfort, and then reattempt cerclage removal.  -Amp for GBS

## 2023-11-23 NOTE — OB NEONATOLOGY/PEDIATRICIAN DELIVERY SUMMARY - NSPEDSNEONOTESA_OBGYN_ALL_OB_FT
Requested by OB to attend this vaginal delivery at  weeks for  delivery and NRHR. Mother is a 37 year old,  , blood type A   pos.  Prenatal labs as follow: HIV neg, RPR non-reactive, rubella immune, HBsA neg, GBS pos on  received 3 doses of Ampicillin. Prenatal history significant for PPRom, Misc X 1 and D&C. This pregnancy was complicated by PPROM. ROM at 4:15 with clear fluid 16 hours prior to delivery.  Infant emerged vertex, limp, with poor tone. Delayed cord clamping X 20  secs, then brought to warmer. Dried, suctioned and stimulated . Apgars 3 /9. Mom wishes to breast feed. Consent to  circumcision. Infant admitted to NICU for further management of care. Parents updated. Requested by OB to attend this vaginal delivery at  weeks for  delivery and NRHR. Mother is a 37 year old,  , blood type A   pos.  Prenatal labs as follow: HIV neg, RPR non-reactive, rubella immune, HBsA neg, GBS pos on  received 3 doses of Ampicillin. Prenatal history significant for PPRom, Misc X 1 and D&C. This pregnancy was complicated by PPROM. ROM at 4:15 with clear fluid 16 hours prior to delivery.  Infant emerged vertex, limp, with poor tone. Delayed cord clamping X 20  secs, then brought to warmer. Dried, suctioned and stimulated, PPV started at  20/5 at 30% for 3 min. Code 100 was called due to no respiratory effort and poor tone. Heart rate improved and established respiratory effort at 3 min of life.  Apgars 3/9. Mom wishes to breast feed. Consent to  circumcision. Infant admitted to NICU for further management of care. Parents updated. Requested by OB to attend this vaginal delivery at  weeks for  delivery and NRHR. Mother is a 37 year old,  , blood type A   pos.  Prenatal labs as follow: HIV neg, RPR non-reactive, rubella immune, HBsA neg, GBS pos on  received 3 doses of Ampicillin. Prenatal history significant for PPRom, Misc X 1 and D&C. This pregnancy was complicated by PPROM. ROM at 4:15 with clear fluid 16 hours prior to delivery.  Infant emerged vertex, limp, with poor tone. Delayed cord clamping X 20  secs, then brought to warmer. Dried, suctioned and stimulated, PPV started at  20/5 at 30% for 3 min. Code 100 was called due to no respiratory effort and poor tone. Attended by Dr Melendez. Heart rate improved and established respiratory effort at 3 min of life.  Apgars 3/9. Mom wishes to breast feed. Consent to  circumcision. Infant admitted to NICU for further management of care. Parents updated.

## 2023-11-23 NOTE — OB RN PATIENT PROFILE - BMI (KG/M2)
SANAM GONZALEZ; First Name: Sushil      GA 33.4 weeks;     Age: 12 d;   PMA: 34   BW:  2250   MRN: [id number]    COURSE: 33 week  male, IDM   s/p TTN , presumed sepsis , hyperbili, immature thermoregulation    INTERVAL EVENTS: No acute events overnight.     Weight (g): 2145 +65                           Intake (ml/kg/day): 156  Urine output (ml/kg/hr or frequency): x8                           Stools (frequency): x 5  Other:     Growth:    HC (cm): 32.5 (-11)    3/15 31.5        [-11]  Length (cm):  445.5  [place] weight %  ____ ; ADWG (g/day)  _____ .  *******************************************************  Respiratory: RA  s/p RDS/TTN.  Initial ABG with mixed acidosis.  Bubble CPAP discontinued 3/12.  Monitor respiratory status.  CV: Stable hemodynamics. Continue cardiorespiratory monitoring.   Hem: Mother O+,/O+/C neg   S/p photo (3/16) for  hyperbilirubinemia due to prematurity. Bili levels stable off phototherapy and below threshold. Continue to monitor clinically for jaundice.    FEN: Increase  feeds 42 cc  Q3h PO/OG EBM/Hxbjnso16 over 30min (). IDF  ( 61 % PO). Consider fortifying feeds if inadequate weight gain or if unable to take sufficient volume when on ad osvaldo feeds         ID: PPROM x 5 weeks, no maternal fever, GBS negative.  Serial CBC diff reassuring without left shift.  Blood culture sent on admission neg, s/p 48h empiric antibiotics.  .  Monitor for signs and symptoms of sepsis.     Neuro: NDE 3/17. NRE=7, no EI, f/u 6 months   : penile torsion, not cleared for circ. Refer to urology for outpatient circ.   Thermal: Weaned to crib AM 3/16. Monitor temps in crib prior to discharge. Temps stable in open crib.   Social: Parental support/education.  Labs/Images/Studies:   Meds: PVS SANAM GONZALEZ; First Name: Sushil      GA 33.4 weeks;     Age: 12 d;   PMA: 34   BW:  2250   MRN: [id number]    COURSE: 33 week  male, IDM, SARAH   s/p TTN , presumed sepsis , hyperbili, immature thermoregulation    INTERVAL EVENTS: No acute events overnight, reflux episode (spitups)     Weight (g): 2160 +15                           Intake (ml/kg/day): 157  Urine output (ml/kg/hr or frequency): x8                           Stools (frequency): x 2  Other:     Growth:    HC (cm): 32.5 (03-11)    3/15 31.5        [03-11]  Length (cm):  445.5  [place] weight %  ____ ; ADWG (g/day)  _____ .  *******************************************************  Respiratory: RA  s/p RDS/TTN.  Initial ABG with mixed acidosis.  Bubble CPAP discontinued 3/12.  Monitor respiratory status.  CV: Stable hemodynamics. Continue cardiorespiratory monitoring.   Hem: Mother O+,/O+/C neg   S/p photo (3/16) for  hyperbilirubinemia due to prematurity. Bili levels stable off phototherapy and below threshold. Continue to monitor clinically for jaundice.    FEN: Feeding 44 cc Q3h PO/OG EBM/Pqtqoyg93 over 30min (). IDF  ( 82 % PO). Consider fortifying feeds if inadequate weight gain or if unable to take sufficient volume when on ad osvaldo feeds         ID: PPROM x 5 weeks, no maternal fever, GBS negative.  Serial CBC diff reassuring without left shift.  Blood culture sent on admission neg, s/p 48h empiric antibiotics.  .  Monitor for signs and symptoms of sepsis.     Neuro: NDE 3/17. NRE=7, no EI, f/u 6 months   : penile torsion, not cleared for circ. Refer to urology for outpatient circ.   Thermal: Weaned to crib AM 3/16. Monitor temps in crib prior to discharge. Temps stable in open crib.   Social: Parental support/education. Parents updated by SAYDA Greenfield 3/22.  Labs/Images/Studies:   Meds: BRYCE SANAM GONZALEZ; First Name: Sushil      GA 33.4 weeks;     Age: 12 d;   PMA: 34   BW:  2250   MRN: [id number]    COURSE: 33 week  male, IDM, SARAH, r/o penile torsion    s/p TTN , presumed sepsis , hyperbili, immature thermoregulation    INTERVAL EVENTS: No acute events overnight, reflux episode (spitups)     Weight (g): 2160 +15                           Intake (ml/kg/day): 157  Urine output (ml/kg/hr or frequency): x8                           Stools (frequency): x 2  Other:     Growth:    HC (cm): 32.5 (-)    3/15 31.5        [-11]  Length (cm):  445.5  [place] weight %  ____ ; ADWG (g/day)  _____ .  *******************************************************  Respiratory: RA  s/p RDS/TTN.  Initial ABG with mixed acidosis.  Bubble CPAP discontinued 3/12.  Monitor respiratory status.  CV: Stable hemodynamics. Continue cardiorespiratory monitoring.   Hem: Mother O+,/O+/C neg   S/p photo (3/16) for  hyperbilirubinemia due to prematurity. Bili levels stable off phototherapy and below threshold. Continue to monitor clinically for jaundice.    FEN: Feeding 44 cc Q3h PO/OG EBM/Lidchtt58 over 30min (). IDF  ( 82 % PO). Consider fortifying feeds if inadequate weight gain or if unable to take sufficient volume when on ad osvaldo feeds         ID: PPROM x 5 weeks, no maternal fever, GBS negative.  Serial CBC diff reassuring without left shift.  Blood culture sent on admission neg, s/p 48h empiric antibiotics.  .  Monitor for signs and symptoms of sepsis.     Neuro: NDE 3/17. NRE=7, no EI, f/u 6 months   : penile torsion, not cleared for circ. Refer to urology for outpatient circ.   Thermal: Weaned to crib AM 3/16. Monitor temps in crib prior to discharge. Temps stable in open crib.   Social: Parental support/education. Parents updated by NP Gin 3/22.  Labs/Images/Studies:   Meds: PVS 33.5

## 2023-11-23 NOTE — OB PROVIDER H&P - HISTORY OF PRESENT ILLNESS
HPI: Pt is a 37 yo  @34w1d presenting for with painful contractions and leakage of fluid. Patient states she started having pain ful contractions at 3am this morning q8-10 min, now q2-3 min. Patient endorses a large gush of clear fluid at 4am. Continued leakage since then. Patient endorses good fetal movement, denies vaginal bleeding.     Prenatal care at [facility], had one visit with [facility] on .     Prenatal Issues:   - hx indicated cerclage placed this pregnancy, placed in [place] at 14w  - hx of previable  labor with ROM at 20w,  followed by fetal demise     GBS from  when she presented to triage, which was positive   EFW 1973g on US  (28%)     OBHx:  2012  @ 20w for previable PPROM and contractions, required D&C for retained POC    Gyn Hx: remote hx abnormal pap, negative since     PMH: denies     SHx: D&C, arthroscopic knee surgery    Psych: denies     Social: denies     Medications: vaginal progesterone, ASA, PNV, iron     Allergies: NKDA    Will Accept blood transfusion? yes

## 2023-11-23 NOTE — OB PROVIDER DELIVERY SUMMARY - NSPROVIDERDELIVERYNOTE_OBGYN_ALL_OB_FT
Pt became fully dilated and desired to push.   Spontaneous vaginal delivery of liveborn male.   Head, shoulders and body delivered easily.  Infant was passed to mother.  Cord clamping was delayed 1 minute. Cord was cut.  Placenta delivered spontaneously intact. Uterine massage was performed and pitocin was given.  Fundus was firm. Second degree laceration repaired with 2-0 vicryl.    Straight catheter placed with 75cc output. Left periurethral repaired with 3-0 vicryl.   Good hemostasis was noted.  No perineal, cervical, or vaginal lacerations noted.  Count correct x2. Mother and baby resting comfortably.   Pt became fully dilated and desired to push.   Spontaneous vaginal delivery of liveborn male.   Head, shoulders and body delivered easily.  Infant was passed to mother.  Cord clamping was delayed 1 minute. Cord was cut.  Placenta delivered spontaneously intact. Uterine massage was performed and pitocin was given.  Fundus was firm. Second degree laceration repaired with 2-0 vicryl.    Straight catheter placed with 75cc output. Left periurethral repaired with 3-0 vicryl.   Good hemostasis was noted.  No perineal, cervical, or vaginal lacerations noted.  Count correct x2. Mother and baby resting comfortably.        ATTG:  Pt FD and pushing. Deep decels noted, pt examined @+3 station. Pt urged to push but pushed very poorly. I could not apply a vacuum to expedite delivery because of prematurity. FHR down and pt pushing poorly. With a combination of fundal pressure, R[place] Maneuver, and RML, a spontaneous delivery of a live male baby, cord clamped and cut and baby handed to awaiting peds, Apgars 3,9. Cord gases sent. Remainder of delivery details per resident note above.

## 2023-11-23 NOTE — OB PROVIDER DELIVERY SUMMARY - NSLOWPPHRISK_OBGYN_A_OB
Gentile Pregnancy/Less than or equal to 4 previous vaginal births/No known bleeding disorder/No history of postpartum hemorrhage/No other PPH risks indicated

## 2023-11-23 NOTE — OB PROVIDER LABOR PROGRESS NOTE - ASSESSMENT
37yo P0@34+wks GA undergoing induction of labor for PPROM.  FHR is Category 2  Will continue with fetal resuscitative measures  If FHR does not improve, she may require abdominal delivery
A/P 38y  @ 34/1 wks IOL for PPROM   -IOL: Pitocin turned off  -Patient placed in semi fowlers at this time with improvement in tracing   -Continue to resuscitate tracing   -Cat 2 tracing  -Analgesia: Epidural in situ and effective , patient recently received an epidural top off   -Anticipate     d/w Dr. Geovanna Hartley PGY-4  
- IUPC placed  - Will start amnioinfusion  - c/w pitocin   - c/w maternal repositioning     Lo Hassan, PGY3  d/w Dr. Austin 
- c/w pitocin, titrate as needed   - anesthesia to be called for top off     Lo Hassan, PGY3  d/w Dr. Austin 
38 y.o.  @34w1d presenting w/ PPROM @34w1d. Patient s/p cerclage removal, on pitocin for augmentation. PAtient with intermittent category 2 tracing throughout the day, pitocin now off. She has made appropriate cervical change, now fully dilated.     - patient instructed on pushing   - Anticipate      d/w Dr. Austin,  in house   Laquita Escamilla PGY-4

## 2023-11-23 NOTE — OB RN DELIVERY SUMMARY - NS_RNDELIVATTEST_OBGYN_ALL_OB
OB Provider reported that the vagina was inspected and no foreign body was found/Laps, needles and instrument count was correct
negative - no pain, no limited range of motion

## 2023-11-23 NOTE — PROCEDURE NOTE - ADDITIONAL PROCEDURE DETAILS
Breisky retractor used to expose the cervix. Knot grasped with a ring forceps and tented up. Merselene suture cut on one side. A second knot was noted, stitch was cut allowing for complete removal of cerclage, intact. VE after cerclage removal 1/70/-3.     Lo Hassan, PGY3

## 2023-11-23 NOTE — OB RN DELIVERY SUMMARY - NSSELHIDDEN_OBGYN_ALL_OB_FT
[[email or web address]_OB_FT:[email or web address]],[NS_DeliveryAssist2_OBGYN_ALL_OB_FT:[id number]=]

## 2023-11-23 NOTE — OB PROVIDER H&P - NSHPLABSRESULTS_GEN_ALL_CORE
Vital Signs Last 24 Hrs  T(C): 37.0 (23 Nov 2023 05:51), Max: 37 (23 Nov 2023 05:46)  T(F): 98.6 (23 Nov 2023 05:51), Max: 98.6 (23 Nov 2023 05:46)  HR: 99 (23 Nov 2023 06:55) (85 - 101)  BP: 133/87 (23 Nov 2023 05:51) (133/87 - 133/87)  BP(mean): --  RR: 18 (23 Nov 2023 05:46) (18 - 18)  SpO2: 99% (23 Nov 2023 06:55) (98% - 100%)    Parameters below as of 23 Nov 2023 05:46  Patient On (Oxygen Delivery Method): room air        Physical Exam:  Gen: painfully armen, AOx3  CV: RR  Resp: unlabored respirations  Abd: soft, NT, ND    Speculum Exam:   - pooling positive  - nitrazine positive   - ferning positive  - cervix appears dilated     SVE: 1/70/-3, cerclage palpated in place   FHT: 130s, moderate variability, accels, no decels   Pollock: q3-5 min   Sono: vertex, BPP 8/8, ODNTE 6.2

## 2023-11-23 NOTE — OB PROVIDER LABOR PROGRESS NOTE - NS_OBIHIFHRDETAILS_OBGYN_ALL_OB_FT
120, moderate variability, accels, intermittent variable decles
Baseline 140 , moderate variability, - accels, + lates, + variables
Baseline 140, moderate variability, + accelerations, late and variable decelerations intermixed
130s, moderate variability, accels, no decels
-938, mod BTB variability, (-)accels, (+)decels variable appearing but late in timing

## 2023-11-23 NOTE — OB RN DELIVERY SUMMARY - NS_SEPSISRSKCALC_OBGYN_ALL_OB_FT
EOS calculated successfully. EOS Risk Factor: 0.5/1000 live births ([facility] national incidence); GA=34w1d; Temp=99.14; ROM=15.483; GBS='Positive'; Antibiotics='GBS specific antibiotics > 2 hrs prior to birth'

## 2023-11-23 NOTE — OB PROVIDER DELIVERY SUMMARY - NSSELHIDDEN_OBGYN_ALL_OB_FT
[NS_DeliveryAttending1_OBGYN_ALL_OB_FT:[email or web address]],[NS_DeliveryAssist1_OBGYN_ALL_OB_FT:[id number]=]

## 2023-11-23 NOTE — OB PROVIDER LABOR PROGRESS NOTE - NS_SUBJECTIVE/OBJECTIVE_OBGYN_ALL_OB_FT
VE to evaluate progress in labor. Patient feeling increase pain and pressure with contractions.
R4 Labor Note    S: Patient evaluated at bedside for cervical change.     O:  T(C): 37.1 (11-23-23 @ 15:04), Max: 37.1 (11-23-23 @ 13:32)  HR: 98 (11-23-23 @ 15:56) (74 - 134)  BP: 111/63 (11-23-23 @ 15:54) (70/33 - 135/84)  RR: 20 (11-23-23 @ 15:04) (17 - 20)  SpO2: 99% (11-23-23 @ 15:56) (85% - 100%)
Pt relatively comfortable, epidural in place  Vital Signs Last 24 Hrs  T(C): 37.1 (23 Nov 2023 17:28), Max: 37.1 (23 Nov 2023 13:32)  T(F): 98.78 (23 Nov 2023 17:28), Max: 98.78 (23 Nov 2023 13:32)  HR: 101 (23 Nov 2023 18:46) (74 - 134)  BP: 127/60 (23 Nov 2023 18:44) (70/33 - 135/84)  BP(mean): --  RR: 19 (23 Nov 2023 17:30) (17 - 20)  SpO2: 100% (23 Nov 2023 18:46) (85% - 100%)    Parameters below as of 23 Nov 2023 06:51  Patient On (Oxygen Delivery Method): room air
VE to evaluate progress in labor
Patient seen at bedside for increasing rectal pressure and recurrent decelerations

## 2023-11-23 NOTE — OB RN TRIAGE NOTE - FALL HARM RISK - UNIVERSAL INTERVENTIONS
Bed in lowest position, wheels locked, appropriate side rails in place/Call bell, personal items and telephone in reach/Instruct patient to call for assistance before getting out of bed or chair/Non-slip footwear when patient is out of bed/[place] to call system/Physically safe environment - no spills, clutter or unnecessary equipment/Purposeful Proactive Rounding/Room/bathroom lighting operational, light cord in reach

## 2023-11-24 ENCOUNTER — TRANSCRIPTION ENCOUNTER (OUTPATIENT)
Age: 38
End: 2023-11-24

## 2023-11-24 LAB
GROUP B BETA STREP DNA (PCR): DETECTED
GROUP B BETA STREP DNA (PCR): DETECTED
SOURCE GROUP B STREP: SIGNIFICANT CHANGE UP
SOURCE GROUP B STREP: SIGNIFICANT CHANGE UP
T PALLIDUM AB TITR SER: NEGATIVE — SIGNIFICANT CHANGE UP
T PALLIDUM AB TITR SER: NEGATIVE — SIGNIFICANT CHANGE UP

## 2023-11-24 RX ORDER — ACETAMINOPHEN 500 MG
3 TABLET ORAL
Qty: 0 | Refills: 0 | DISCHARGE
Start: 2023-11-24

## 2023-11-24 RX ORDER — IBUPROFEN 200 MG
600 TABLET ORAL EVERY 6 HOURS
Refills: 0 | Status: DISCONTINUED | OUTPATIENT
Start: 2023-11-24 | End: 2023-11-25

## 2023-11-24 RX ORDER — IBUPROFEN 200 MG
1 TABLET ORAL
Qty: 0 | Refills: 0 | DISCHARGE
Start: 2023-11-24

## 2023-11-24 RX ADMIN — Medication 1 TABLET(S): at 11:49

## 2023-11-24 RX ADMIN — Medication 600 MILLIGRAM(S): at 05:19

## 2023-11-24 RX ADMIN — Medication 600 MILLIGRAM(S): at 05:50

## 2023-11-24 RX ADMIN — Medication 975 MILLIGRAM(S): at 02:45

## 2023-11-24 RX ADMIN — Medication 975 MILLIGRAM(S): at 02:10

## 2023-11-24 RX ADMIN — Medication 600 MILLIGRAM(S): at 18:47

## 2023-11-24 RX ADMIN — Medication 600 MILLIGRAM(S): at 11:49

## 2023-11-24 RX ADMIN — Medication 975 MILLIGRAM(S): at 16:14

## 2023-11-24 RX ADMIN — Medication 975 MILLIGRAM(S): at 08:21

## 2023-11-24 RX ADMIN — Medication 975 MILLIGRAM(S): at 08:51

## 2023-11-24 RX ADMIN — Medication 975 MILLIGRAM(S): at 21:06

## 2023-11-24 RX ADMIN — Medication 975 MILLIGRAM(S): at 20:45

## 2023-11-24 NOTE — DISCHARGE NOTE OB - PROVIDER TOKENS
FREE:[LAST:[[facility]],PHONE:[phone]],FAX:[(   [phone]    -],ADDRESS:[[address] [place] [address] [address], [place] [address]],FOLLOWUP:[1 month]]

## 2023-11-24 NOTE — DISCHARGE NOTE OB - HOSPITAL COURSE
Patient is a 37 YO  who experienced normal spontaneous vaginal delivery () at 34weeks 1 day. Labor course was complicated by PPROM at 34w1d, delivery was uncomplicated. Postpartum course was unremarkable. Patient was transferred to postpartum floor and monitored. Patient is medically optimized for discharge and is instructed to follow up at [facility] in 4 weeks for routine postpartum care. Patient verbalizes understanding and agreement with treatment and follow up plan and is satisfied with her care. At the time of discharge, patient was ambulating independently, tolerating PO intake, voiding and stooling appropriately, passing flatus and pain is well controlled.

## 2023-11-24 NOTE — DISCHARGE NOTE OB - PATIENT PORTAL LINK FT
You can access the FollowMyHealth Patient Portal offered by [facility] by registering at the following website: [email or web address]. By joining [employer]’s FollowMyHealth portal, you will also be able to view your health information using other applications (apps) compatible with our system.

## 2023-11-24 NOTE — DISCHARGE NOTE OB - CARE PLAN
Principal Discharge DX:	Vaginal delivery  Assessment and plan of treatment:	follow up in the office in six weeks; please call to confirm your appointment; regular diet; pain control with motrin and tylenol; nothing per vagina; no heavy lifting; call with any concerns or questions   1

## 2023-11-24 NOTE — DISCHARGE NOTE OB - MEDICATION SUMMARY - MEDICATIONS TO TAKE
I will START or STAY ON the medications listed below when I get home from the hospital:    ibuprofen 600 mg oral tablet  -- 1 tab(s) by mouth every 6 hours  -- Indication: For Vaginal delivery    acetaminophen 325 mg oral tablet  -- 3 tab(s) by mouth every 6 hours  -- Indication: For Vaginal delivery   I will START or STAY ON the medications listed below when I get home from the hospital:    ibuprofen 600 mg oral tablet  -- 1 tab(s) by mouth every 6 hours  -- Indication: For Vaginal delivery    acetaminophen 325 mg oral tablet  -- 3 tab(s) by mouth every 6 hours  -- Indication: For Vaginal delivery    Vidya 0.35 mg oral tablet  -- 1 tab(s) by mouth once a day start sunday after delivery. skip placebo week  -- Indication: For contraception

## 2023-11-24 NOTE — DISCHARGE NOTE OB - PLAN OF CARE
follow up in the office in six weeks; please call to confirm your appointment; regular diet; pain control with motrin and tylenol; nothing per vagina; no heavy lifting; call with any concerns or questions

## 2023-11-24 NOTE — DISCHARGE NOTE OB - MATERIALS PROVIDED
[facility] Screening Program/Breastfeeding Log/Guide to Postpartum Care/Back To Sleep Handout/Breastfeeding Guide and Packet

## 2023-11-24 NOTE — DISCHARGE NOTE OB - CARE PROVIDER_API CALL
[facility],   [address] [place] [address] [address], [place] [address]  Phone: [phone]  Fax: (   )    -  Follow Up Time: 1 month

## 2023-11-25 VITALS
TEMPERATURE: 99 F | HEART RATE: 84 BPM | SYSTOLIC BLOOD PRESSURE: 127 MMHG | RESPIRATION RATE: 18 BRPM | OXYGEN SATURATION: 98 % | DIASTOLIC BLOOD PRESSURE: 83 MMHG

## 2023-11-25 PROCEDURE — 88307 TISSUE EXAM BY PATHOLOGIST: CPT

## 2023-11-25 PROCEDURE — 59050 FETAL MONITOR W/REPORT: CPT

## 2023-11-25 PROCEDURE — 86900 BLOOD TYPING SEROLOGIC ABO: CPT

## 2023-11-25 PROCEDURE — 85025 COMPLETE CBC W/AUTO DIFF WBC: CPT

## 2023-11-25 PROCEDURE — 87653 STREP B DNA AMP PROBE: CPT

## 2023-11-25 PROCEDURE — 86780 TREPONEMA PALLIDUM: CPT

## 2023-11-25 PROCEDURE — 86850 RBC ANTIBODY SCREEN: CPT

## 2023-11-25 PROCEDURE — 86901 BLOOD TYPING SEROLOGIC RH(D): CPT

## 2023-11-25 RX ORDER — NORETHINDRONE 0.35 MG/1
1 TABLET ORAL
Qty: 28 | Refills: 2
Start: 2023-11-25

## 2023-11-25 RX ADMIN — Medication 600 MILLIGRAM(S): at 05:25

## 2023-11-25 RX ADMIN — Medication 975 MILLIGRAM(S): at 10:00

## 2023-11-25 RX ADMIN — Medication 1 TABLET(S): at 12:57

## 2023-11-25 RX ADMIN — Medication 600 MILLIGRAM(S): at 06:21

## 2023-11-25 RX ADMIN — Medication 975 MILLIGRAM(S): at 15:55

## 2023-11-25 RX ADMIN — Medication 975 MILLIGRAM(S): at 02:29

## 2023-11-25 RX ADMIN — Medication 975 MILLIGRAM(S): at 09:06

## 2023-11-25 RX ADMIN — Medication 600 MILLIGRAM(S): at 18:09

## 2023-11-25 RX ADMIN — Medication 600 MILLIGRAM(S): at 13:55

## 2023-11-25 RX ADMIN — Medication 975 MILLIGRAM(S): at 03:12

## 2023-11-25 RX ADMIN — Medication 600 MILLIGRAM(S): at 17:26

## 2023-11-25 RX ADMIN — Medication 975 MILLIGRAM(S): at 14:56

## 2023-11-25 RX ADMIN — Medication 600 MILLIGRAM(S): at 12:57

## 2023-11-25 NOTE — PROGRESS NOTE ADULT - ASSESSMENT
A/P: 37yo PPD#2 s/p  with PTD @34+1 wga.  Patient is stable and doing well post-partum.    - Pain well controlled, continue current pain regimen  - Increase ambulation, SCDs when not ambulating  - Continue regular diet  - Discharge planning     Zion Bradford MD PGY1  
A/P: 39yo PPD#1  s/p  and PTD@34w1d.  Patient is stable and doing well post-partum.   - Pain well controlled, continue current pain regimen  - Increase ambulation, SCDs when not ambulating  - Continue regular diet    Lamar Ocampo PGY1

## 2023-11-25 NOTE — PROGRESS NOTE ADULT - ATTENDING COMMENTS
37yo  postpartum day 2 from  at 34w1d in setting of PPROM/PTL. Pt followed in [facility] for poor ob hx (hx indicated cerclage in this pregnancy, placed in [place] for hx of 20 wk loss). She underwent uncomplicated cerclage removal/. Pt recovering well in immediate postpartum period. OOB, ambulating, tolerating diet. Minimal VB. Feeling well. Pt desires progesterone only pills for contraception. Pt to follow up in [facility] for 6 week postpartum visit. Discharge home.     Tho SCHULTE  Ob

## 2023-11-25 NOTE — PROGRESS NOTE ADULT - SUBJECTIVE AND OBJECTIVE BOX
OB Progress Note:  PPD#1    S: 37yo PPD#1 s/p . Patient feels well. Pain is well controlled. She is tolerating a regular diet and passing flatus. She is voiding spontaneously, and ambulating without difficulty. Denies CP/SOB. Denies lightheadedness/dizziness. Denies N/V.    O:  Vitals:  Vital Signs Last 24 Hrs  T(C): 37.1 (2023 06:08), Max: 37.7 (2023 20:36)  T(F): 98.8 (2023 06:08), Max: 99.9 (2023 20:36)  HR: 78 (2023 06:08) (74 - 171)  BP: 102/70 (2023 06:08) (70/33 - 150/107)  BP(mean): --  RR: 18 (2023 06:08) (16 - 20)  SpO2: 97% (2023 06:08) (85% - 100%)    Parameters below as of 2023 06:08  Patient On (Oxygen Delivery Method): room air        MEDICATIONS  (STANDING):  acetaminophen     Tablet .. 975 milliGRAM(s) Oral <User Schedule>  diphtheria/tetanus/pertussis (acellular) Vaccine (Adacel) 0.5 milliLiter(s) IntraMuscular once  ibuprofen  Tablet. 600 milliGRAM(s) Oral every 6 hours  influenza   Vaccine 0.5 milliLiter(s) IntraMuscular once  ondansetron Injectable 4 milliGRAM(s) IV Push once  oxytocin Infusion 41.667 milliUNIT(s)/Min (125 mL/Hr) IV Continuous <Continuous>  prenatal multivitamin 1 Tablet(s) Oral daily  sodium chloride 0.9% Bolus 500 milliLiter(s) IV Bolus once  sodium chloride 0.9% lock flush 3 milliLiter(s) IV Push every 8 hours  sodium chloride 0.9%. 1000 milliLiter(s) (200 mL/Hr) IV Continuous <Continuous>      Labs:  Blood type: A Positive  Rubella IgG: RPR: Negative                          14.3   14.42<H> >-----------< 208    ( - @ 07:38 )             41.9      Physical Exam:  General: NAD  Abdomen: soft, non-tender, non-distended, fundus firm  Vaginal: Lochia wnl  Extremities: No erythema/edema    
OB Progress Note:  PPD#2    S: 39yo PPD#2 s/p  with PTD @34+1 wga. Patient feels well. Pain is well controlled, tolerating regular diet, passing flatus, voiding spontaneously, ambulating without difficulty. Denies heavy vaginal bleeding, CP/SOB, leadheadedness/dizziness, N/V.    O:  Vitals:   Vital Signs Last 24 Hrs  T(C): 36.6 (2023 05:00), Max: 36.9 (2023 08:55)  T(F): 97.8 (2023 05:00), Max: 98.5 (2023 08:55)  HR: 74 (2023 05:00) (74 - 109)  BP: 113/75 (2023 05:00) (113/75 - 132/80)  BP(mean): --  RR: 18 (2023 05:00) (18 - 18)  SpO2: 98% (2023 05:00) (97% - 98%)    Parameters below as of 2023 05:00  Patient On (Oxygen Delivery Method): room air        MEDICATIONS  (STANDING):  acetaminophen     Tablet .. 975 milliGRAM(s) Oral <User Schedule>  diphtheria/tetanus/pertussis (acellular) Vaccine (Adacel) 0.5 milliLiter(s) IntraMuscular once  ibuprofen  Tablet. 600 milliGRAM(s) Oral every 6 hours  influenza   Vaccine 0.5 milliLiter(s) IntraMuscular once  ondansetron Injectable 4 milliGRAM(s) IV Push once  oxytocin Infusion 41.667 milliUNIT(s)/Min (125 mL/Hr) IV Continuous <Continuous>  prenatal multivitamin 1 Tablet(s) Oral daily  sodium chloride 0.9% Bolus 500 milliLiter(s) IV Bolus once  sodium chloride 0.9% lock flush 3 milliLiter(s) IV Push every 8 hours  sodium chloride 0.9%. 1000 milliLiter(s) (200 mL/Hr) IV Continuous <Continuous>    MEDICATIONS  (PRN):  benzocaine 20%/menthol 0.5% Spray 1 Spray(s) Topical every 6 hours PRN for Perineal discomfort  dibucaine 1% Ointment 1 Application(s) Topical every 6 hours PRN Perineal discomfort  diphenhydrAMINE 25 milliGRAM(s) Oral every 6 hours PRN Pruritus  hydrocortisone 1% Cream 1 Application(s) Topical every 6 hours PRN Moderate Pain (4-6)  lanolin Ointment 1 Application(s) Topical every 6 hours PRN nipple soreness  magnesium hydroxide Suspension 30 milliLiter(s) Oral two times a day PRN Constipation  oxyCODONE    IR 5 milliGRAM(s) Oral once PRN Moderate to Severe Pain (4-10)  oxyCODONE    IR 5 milliGRAM(s) Oral every 3 hours PRN Moderate to Severe Pain (4-10)  pramoxine 1%/zinc 5% Cream 1 Application(s) Topical every 4 hours PRN Moderate Pain (4-6)  simethicone 80 milliGRAM(s) Chew every 4 hours PRN Gas  witch hazel Pads 1 Application(s) Topical every 4 hours PRN Perineal discomfort      Labs:  Blood type: A Positive  Rubella IgG: RPR: Negative                          14.3   14.42<H> >-----------< 208    ( 1123 @ 07:38 )             41.9                  Physical Exam:  General: NAD  CV: RRR  Resp: CTAB  Abdomen: soft, non-tender, non-distended, fundus firm  : Nl lochia  Extremities: No erythema/edema    
Suspected 2019-nCoV infection

## 2023-11-27 ENCOUNTER — INPATIENT (INPATIENT)
Facility: HOSPITAL | Age: 38
LOS: 1 days | Discharge: HOME | DRG: 776 | End: 2023-11-29
Attending: OBSTETRICS & GYNECOLOGY | Admitting: OBSTETRICS & GYNECOLOGY
Payer: MEDICAID

## 2023-11-27 ENCOUNTER — NON-APPOINTMENT (OUTPATIENT)
Age: 38
End: 2023-11-27

## 2023-11-27 VITALS
RESPIRATION RATE: 20 BRPM | HEART RATE: 75 BPM | SYSTOLIC BLOOD PRESSURE: 181 MMHG | TEMPERATURE: 99 F | DIASTOLIC BLOOD PRESSURE: 81 MMHG | HEIGHT: 58 IN | OXYGEN SATURATION: 99 %

## 2023-11-27 DIAGNOSIS — Z98.890 OTHER SPECIFIED POSTPROCEDURAL STATES: Chronic | ICD-10-CM

## 2023-11-27 DIAGNOSIS — O14.90 UNSPECIFIED PRE-ECLAMPSIA, UNSPECIFIED TRIMESTER: ICD-10-CM

## 2023-11-27 PROBLEM — Z86.2 PERSONAL HISTORY OF DISEASES OF THE BLOOD AND BLOOD-FORMING ORGANS AND CERTAIN DISORDERS INVOLVING THE IMMUNE MECHANISM: Chronic | Status: ACTIVE | Noted: 2023-11-23

## 2023-11-27 LAB
ALBUMIN SERPL ELPH-MCNC: 3.8 G/DL — SIGNIFICANT CHANGE UP (ref 3.3–5)
ALBUMIN SERPL ELPH-MCNC: 3.8 G/DL — SIGNIFICANT CHANGE UP (ref 3.3–5)
ALP SERPL-CCNC: 135 U/L — HIGH (ref 40–120)
ALP SERPL-CCNC: 135 U/L — HIGH (ref 40–120)
ALT FLD-CCNC: 45 U/L — SIGNIFICANT CHANGE UP (ref 10–45)
ALT FLD-CCNC: 45 U/L — SIGNIFICANT CHANGE UP (ref 10–45)
ANION GAP SERPL CALC-SCNC: 12 MMOL/L — SIGNIFICANT CHANGE UP (ref 5–17)
ANION GAP SERPL CALC-SCNC: 12 MMOL/L — SIGNIFICANT CHANGE UP (ref 5–17)
APPEARANCE UR: CLEAR — SIGNIFICANT CHANGE UP
APPEARANCE UR: CLEAR — SIGNIFICANT CHANGE UP
APTT BLD: 28.4 SEC — SIGNIFICANT CHANGE UP (ref 24.5–35.6)
APTT BLD: 28.4 SEC — SIGNIFICANT CHANGE UP (ref 24.5–35.6)
AST SERPL-CCNC: 34 U/L — SIGNIFICANT CHANGE UP (ref 10–40)
AST SERPL-CCNC: 34 U/L — SIGNIFICANT CHANGE UP (ref 10–40)
BACTERIA # UR AUTO: NEGATIVE /HPF — SIGNIFICANT CHANGE UP
BACTERIA # UR AUTO: NEGATIVE /HPF — SIGNIFICANT CHANGE UP
BASOPHILS # BLD AUTO: 0.06 K/UL — SIGNIFICANT CHANGE UP (ref 0–0.2)
BASOPHILS # BLD AUTO: 0.06 K/UL — SIGNIFICANT CHANGE UP (ref 0–0.2)
BASOPHILS NFR BLD AUTO: 0.5 % — SIGNIFICANT CHANGE UP (ref 0–2)
BASOPHILS NFR BLD AUTO: 0.5 % — SIGNIFICANT CHANGE UP (ref 0–2)
BILIRUB SERPL-MCNC: 0.1 MG/DL — LOW (ref 0.2–1.2)
BILIRUB SERPL-MCNC: 0.1 MG/DL — LOW (ref 0.2–1.2)
BILIRUB UR-MCNC: NEGATIVE — SIGNIFICANT CHANGE UP
BILIRUB UR-MCNC: NEGATIVE — SIGNIFICANT CHANGE UP
BUN SERPL-MCNC: 12 MG/DL — SIGNIFICANT CHANGE UP (ref 7–23)
BUN SERPL-MCNC: 12 MG/DL — SIGNIFICANT CHANGE UP (ref 7–23)
CALCIUM SERPL-MCNC: 9.2 MG/DL — SIGNIFICANT CHANGE UP (ref 8.4–10.5)
CALCIUM SERPL-MCNC: 9.2 MG/DL — SIGNIFICANT CHANGE UP (ref 8.4–10.5)
CAST: 0 /LPF — SIGNIFICANT CHANGE UP (ref 0–4)
CAST: 0 /LPF — SIGNIFICANT CHANGE UP (ref 0–4)
CHLORIDE SERPL-SCNC: 106 MMOL/L — SIGNIFICANT CHANGE UP (ref 96–108)
CHLORIDE SERPL-SCNC: 106 MMOL/L — SIGNIFICANT CHANGE UP (ref 96–108)
CO2 SERPL-SCNC: 22 MMOL/L — SIGNIFICANT CHANGE UP (ref 22–31)
CO2 SERPL-SCNC: 22 MMOL/L — SIGNIFICANT CHANGE UP (ref 22–31)
COLOR SPEC: YELLOW — SIGNIFICANT CHANGE UP
COLOR SPEC: YELLOW — SIGNIFICANT CHANGE UP
CREAT ?TM UR-MCNC: 18 MG/DL — SIGNIFICANT CHANGE UP
CREAT ?TM UR-MCNC: 18 MG/DL — SIGNIFICANT CHANGE UP
CREAT SERPL-MCNC: 0.56 MG/DL — SIGNIFICANT CHANGE UP (ref 0.5–1.3)
CREAT SERPL-MCNC: 0.56 MG/DL — SIGNIFICANT CHANGE UP (ref 0.5–1.3)
DIFF PNL FLD: ABNORMAL
DIFF PNL FLD: ABNORMAL
EGFR: 120 ML/MIN/1.73M2 — SIGNIFICANT CHANGE UP
EGFR: 120 ML/MIN/1.73M2 — SIGNIFICANT CHANGE UP
EOSINOPHIL # BLD AUTO: 0.19 K/UL — SIGNIFICANT CHANGE UP (ref 0–0.5)
EOSINOPHIL # BLD AUTO: 0.19 K/UL — SIGNIFICANT CHANGE UP (ref 0–0.5)
EOSINOPHIL NFR BLD AUTO: 1.6 % — SIGNIFICANT CHANGE UP (ref 0–6)
EOSINOPHIL NFR BLD AUTO: 1.6 % — SIGNIFICANT CHANGE UP (ref 0–6)
FIBRINOGEN PPP-MCNC: 578 MG/DL — HIGH (ref 200–445)
FIBRINOGEN PPP-MCNC: 578 MG/DL — HIGH (ref 200–445)
GLUCOSE SERPL-MCNC: 74 MG/DL — SIGNIFICANT CHANGE UP (ref 70–99)
GLUCOSE SERPL-MCNC: 74 MG/DL — SIGNIFICANT CHANGE UP (ref 70–99)
GLUCOSE UR QL: NEGATIVE MG/DL — SIGNIFICANT CHANGE UP
GLUCOSE UR QL: NEGATIVE MG/DL — SIGNIFICANT CHANGE UP
HCT VFR BLD CALC: 38.2 % — SIGNIFICANT CHANGE UP (ref 34.5–45)
HCT VFR BLD CALC: 38.2 % — SIGNIFICANT CHANGE UP (ref 34.5–45)
HGB BLD-MCNC: 12.7 G/DL — SIGNIFICANT CHANGE UP (ref 11.5–15.5)
HGB BLD-MCNC: 12.7 G/DL — SIGNIFICANT CHANGE UP (ref 11.5–15.5)
IMM GRANULOCYTES NFR BLD AUTO: 4 % — HIGH (ref 0–0.9)
IMM GRANULOCYTES NFR BLD AUTO: 4 % — HIGH (ref 0–0.9)
INR BLD: 0.93 RATIO — SIGNIFICANT CHANGE UP (ref 0.85–1.18)
INR BLD: 0.93 RATIO — SIGNIFICANT CHANGE UP (ref 0.85–1.18)
KETONES UR-MCNC: NEGATIVE MG/DL — SIGNIFICANT CHANGE UP
KETONES UR-MCNC: NEGATIVE MG/DL — SIGNIFICANT CHANGE UP
LDH SERPL L TO P-CCNC: 283 U/L — HIGH (ref 50–242)
LDH SERPL L TO P-CCNC: 283 U/L — HIGH (ref 50–242)
LEUKOCYTE ESTERASE UR-ACNC: ABNORMAL
LEUKOCYTE ESTERASE UR-ACNC: ABNORMAL
LYMPHOCYTES # BLD AUTO: 19.1 % — SIGNIFICANT CHANGE UP (ref 13–44)
LYMPHOCYTES # BLD AUTO: 19.1 % — SIGNIFICANT CHANGE UP (ref 13–44)
LYMPHOCYTES # BLD AUTO: 2.23 K/UL — SIGNIFICANT CHANGE UP (ref 1–3.3)
LYMPHOCYTES # BLD AUTO: 2.23 K/UL — SIGNIFICANT CHANGE UP (ref 1–3.3)
MAGNESIUM SERPL-MCNC: 2.1 MG/DL — SIGNIFICANT CHANGE UP (ref 1.6–2.6)
MAGNESIUM SERPL-MCNC: 2.1 MG/DL — SIGNIFICANT CHANGE UP (ref 1.6–2.6)
MAGNESIUM SERPL-MCNC: 5.9 MG/DL — HIGH (ref 1.6–2.6)
MAGNESIUM SERPL-MCNC: 5.9 MG/DL — HIGH (ref 1.6–2.6)
MCHC RBC-ENTMCNC: 27.1 PG — SIGNIFICANT CHANGE UP (ref 27–34)
MCHC RBC-ENTMCNC: 27.1 PG — SIGNIFICANT CHANGE UP (ref 27–34)
MCHC RBC-ENTMCNC: 33.2 GM/DL — SIGNIFICANT CHANGE UP (ref 32–36)
MCHC RBC-ENTMCNC: 33.2 GM/DL — SIGNIFICANT CHANGE UP (ref 32–36)
MCV RBC AUTO: 81.4 FL — SIGNIFICANT CHANGE UP (ref 80–100)
MCV RBC AUTO: 81.4 FL — SIGNIFICANT CHANGE UP (ref 80–100)
MONOCYTES # BLD AUTO: 0.57 K/UL — SIGNIFICANT CHANGE UP (ref 0–0.9)
MONOCYTES # BLD AUTO: 0.57 K/UL — SIGNIFICANT CHANGE UP (ref 0–0.9)
MONOCYTES NFR BLD AUTO: 4.9 % — SIGNIFICANT CHANGE UP (ref 2–14)
MONOCYTES NFR BLD AUTO: 4.9 % — SIGNIFICANT CHANGE UP (ref 2–14)
NEUTROPHILS # BLD AUTO: 8.17 K/UL — HIGH (ref 1.8–7.4)
NEUTROPHILS # BLD AUTO: 8.17 K/UL — HIGH (ref 1.8–7.4)
NEUTROPHILS NFR BLD AUTO: 69.9 % — SIGNIFICANT CHANGE UP (ref 43–77)
NEUTROPHILS NFR BLD AUTO: 69.9 % — SIGNIFICANT CHANGE UP (ref 43–77)
NITRITE UR-MCNC: NEGATIVE — SIGNIFICANT CHANGE UP
NITRITE UR-MCNC: NEGATIVE — SIGNIFICANT CHANGE UP
NRBC # BLD: 0 /100 WBCS — SIGNIFICANT CHANGE UP (ref 0–0)
NRBC # BLD: 0 /100 WBCS — SIGNIFICANT CHANGE UP (ref 0–0)
NT-PROBNP SERPL-SCNC: 160 PG/ML — SIGNIFICANT CHANGE UP (ref 0–300)
NT-PROBNP SERPL-SCNC: 160 PG/ML — SIGNIFICANT CHANGE UP (ref 0–300)
PH UR: 6.5 — SIGNIFICANT CHANGE UP (ref 5–8)
PH UR: 6.5 — SIGNIFICANT CHANGE UP (ref 5–8)
PLATELET # BLD AUTO: 301 K/UL — SIGNIFICANT CHANGE UP (ref 150–400)
PLATELET # BLD AUTO: 301 K/UL — SIGNIFICANT CHANGE UP (ref 150–400)
POTASSIUM SERPL-MCNC: 3.9 MMOL/L — SIGNIFICANT CHANGE UP (ref 3.5–5.3)
POTASSIUM SERPL-MCNC: 3.9 MMOL/L — SIGNIFICANT CHANGE UP (ref 3.5–5.3)
POTASSIUM SERPL-SCNC: 3.9 MMOL/L — SIGNIFICANT CHANGE UP (ref 3.5–5.3)
POTASSIUM SERPL-SCNC: 3.9 MMOL/L — SIGNIFICANT CHANGE UP (ref 3.5–5.3)
PROT ?TM UR-MCNC: <7 MG/DL — SIGNIFICANT CHANGE UP (ref 0–12)
PROT ?TM UR-MCNC: <7 MG/DL — SIGNIFICANT CHANGE UP (ref 0–12)
PROT SERPL-MCNC: 6.8 G/DL — SIGNIFICANT CHANGE UP (ref 6–8.3)
PROT SERPL-MCNC: 6.8 G/DL — SIGNIFICANT CHANGE UP (ref 6–8.3)
PROT UR-MCNC: NEGATIVE MG/DL — SIGNIFICANT CHANGE UP
PROT UR-MCNC: NEGATIVE MG/DL — SIGNIFICANT CHANGE UP
PROT/CREAT UR-RTO: SIGNIFICANT CHANGE UP (ref 0–0.2)
PROT/CREAT UR-RTO: SIGNIFICANT CHANGE UP (ref 0–0.2)
PROTHROM AB SERPL-ACNC: 10.2 SEC — SIGNIFICANT CHANGE UP (ref 9.5–13)
PROTHROM AB SERPL-ACNC: 10.2 SEC — SIGNIFICANT CHANGE UP (ref 9.5–13)
RBC # BLD: 4.69 M/UL — SIGNIFICANT CHANGE UP (ref 3.8–5.2)
RBC # BLD: 4.69 M/UL — SIGNIFICANT CHANGE UP (ref 3.8–5.2)
RBC # FLD: 15 % — HIGH (ref 10.3–14.5)
RBC # FLD: 15 % — HIGH (ref 10.3–14.5)
RBC CASTS # UR COMP ASSIST: 11 /HPF — HIGH (ref 0–4)
RBC CASTS # UR COMP ASSIST: 11 /HPF — HIGH (ref 0–4)
SODIUM SERPL-SCNC: 140 MMOL/L — SIGNIFICANT CHANGE UP (ref 135–145)
SODIUM SERPL-SCNC: 140 MMOL/L — SIGNIFICANT CHANGE UP (ref 135–145)
SP GR SPEC: 1.01 — SIGNIFICANT CHANGE UP (ref 1–1.03)
SP GR SPEC: 1.01 — SIGNIFICANT CHANGE UP (ref 1–1.03)
SQUAMOUS # UR AUTO: 2 /HPF — SIGNIFICANT CHANGE UP (ref 0–5)
SQUAMOUS # UR AUTO: 2 /HPF — SIGNIFICANT CHANGE UP (ref 0–5)
URATE SERPL-MCNC: 5.5 MG/DL — SIGNIFICANT CHANGE UP (ref 2.5–7)
URATE SERPL-MCNC: 5.5 MG/DL — SIGNIFICANT CHANGE UP (ref 2.5–7)
UROBILINOGEN FLD QL: 0.2 MG/DL — SIGNIFICANT CHANGE UP (ref 0.2–1)
UROBILINOGEN FLD QL: 0.2 MG/DL — SIGNIFICANT CHANGE UP (ref 0.2–1)
WBC # BLD: 11.69 K/UL — HIGH (ref 3.8–10.5)
WBC # BLD: 11.69 K/UL — HIGH (ref 3.8–10.5)
WBC # FLD AUTO: 11.69 K/UL — HIGH (ref 3.8–10.5)
WBC # FLD AUTO: 11.69 K/UL — HIGH (ref 3.8–10.5)
WBC UR QL: 28 /HPF — HIGH (ref 0–5)
WBC UR QL: 28 /HPF — HIGH (ref 0–5)

## 2023-11-27 PROCEDURE — 70450 CT HEAD/BRAIN W/O DYE: CPT | Mod: 26

## 2023-11-27 PROCEDURE — 99222 1ST HOSP IP/OBS MODERATE 55: CPT | Mod: GC

## 2023-11-27 PROCEDURE — 99285 EMERGENCY DEPT VISIT HI MDM: CPT

## 2023-11-27 RX ORDER — LABETALOL HCL 100 MG
200 TABLET ORAL EVERY 12 HOURS
Refills: 0 | Status: DISCONTINUED | OUTPATIENT
Start: 2023-11-27 | End: 2023-11-29

## 2023-11-27 RX ORDER — ACETAMINOPHEN 500 MG
975 TABLET ORAL EVERY 6 HOURS
Refills: 0 | Status: DISCONTINUED | OUTPATIENT
Start: 2023-11-27 | End: 2023-11-29

## 2023-11-27 RX ORDER — HEPARIN SODIUM 5000 [USP'U]/ML
5000 INJECTION INTRAVENOUS; SUBCUTANEOUS EVERY 12 HOURS
Refills: 0 | Status: DISCONTINUED | OUTPATIENT
Start: 2023-11-27 | End: 2023-11-29

## 2023-11-27 RX ORDER — MAGNESIUM SULFATE 500 MG/ML
4 VIAL (ML) INJECTION ONCE
Refills: 0 | Status: COMPLETED | OUTPATIENT
Start: 2023-11-27 | End: 2023-11-27

## 2023-11-27 RX ORDER — METOCLOPRAMIDE HCL 10 MG
10 TABLET ORAL ONCE
Refills: 0 | Status: COMPLETED | OUTPATIENT
Start: 2023-11-27 | End: 2023-11-27

## 2023-11-27 RX ORDER — INFLUENZA VIRUS VACCINE 15; 15; 15; 15 UG/.5ML; UG/.5ML; UG/.5ML; UG/.5ML
0.5 SUSPENSION INTRAMUSCULAR ONCE
Refills: 0 | Status: DISCONTINUED | OUTPATIENT
Start: 2023-11-27 | End: 2023-11-29

## 2023-11-27 RX ORDER — LABETALOL HCL 100 MG
200 TABLET ORAL ONCE
Refills: 0 | Status: COMPLETED | OUTPATIENT
Start: 2023-11-27 | End: 2023-11-27

## 2023-11-27 RX ORDER — IBUPROFEN 200 MG
600 TABLET ORAL EVERY 6 HOURS
Refills: 0 | Status: DISCONTINUED | OUTPATIENT
Start: 2023-11-27 | End: 2023-11-29

## 2023-11-27 RX ORDER — ACETAMINOPHEN 500 MG
975 TABLET ORAL ONCE
Refills: 0 | Status: COMPLETED | OUTPATIENT
Start: 2023-11-27 | End: 2023-11-27

## 2023-11-27 RX ORDER — SODIUM CHLORIDE 9 MG/ML
1000 INJECTION, SOLUTION INTRAVENOUS
Refills: 0 | Status: DISCONTINUED | OUTPATIENT
Start: 2023-11-27 | End: 2023-11-29

## 2023-11-27 RX ORDER — MAGNESIUM SULFATE 500 MG/ML
2 VIAL (ML) INJECTION
Qty: 40 | Refills: 0 | Status: DISCONTINUED | OUTPATIENT
Start: 2023-11-27 | End: 2023-11-27

## 2023-11-27 RX ORDER — SODIUM CHLORIDE 9 MG/ML
1000 INJECTION INTRAMUSCULAR; INTRAVENOUS; SUBCUTANEOUS ONCE
Refills: 0 | Status: COMPLETED | OUTPATIENT
Start: 2023-11-27 | End: 2023-11-27

## 2023-11-27 RX ADMIN — Medication 975 MILLIGRAM(S): at 12:50

## 2023-11-27 RX ADMIN — Medication 600 MILLIGRAM(S): at 23:43

## 2023-11-27 RX ADMIN — HEPARIN SODIUM 5000 UNIT(S): 5000 INJECTION INTRAVENOUS; SUBCUTANEOUS at 16:57

## 2023-11-27 RX ADMIN — SODIUM CHLORIDE 50 MILLILITER(S): 9 INJECTION, SOLUTION INTRAVENOUS at 14:45

## 2023-11-27 RX ADMIN — SODIUM CHLORIDE 1000 MILLILITER(S): 9 INJECTION INTRAMUSCULAR; INTRAVENOUS; SUBCUTANEOUS at 12:51

## 2023-11-27 RX ADMIN — Medication 200 MILLIGRAM(S): at 13:31

## 2023-11-27 RX ADMIN — Medication 300 GRAM(S): at 13:31

## 2023-11-27 RX ADMIN — Medication 10 MILLIGRAM(S): at 12:50

## 2023-11-27 RX ADMIN — Medication 975 MILLIGRAM(S): at 21:45

## 2023-11-27 RX ADMIN — Medication 975 MILLIGRAM(S): at 20:51

## 2023-11-27 RX ADMIN — Medication 600 MILLIGRAM(S): at 23:14

## 2023-11-27 RX ADMIN — SODIUM CHLORIDE 50 MILLILITER(S): 9 INJECTION, SOLUTION INTRAVENOUS at 13:29

## 2023-11-27 RX ADMIN — Medication 50 GM/HR: at 14:45

## 2023-11-27 NOTE — ED ADULT TRIAGE NOTE - AS PAIN REST
"Spoke with patient today in regard to smoking cessation progress for 3 month telephone follow up, he states tobacco free. Commended patient on the accomplishment thus far. "Patient states the use of nicotine gum to help aid in his quit. Informed patient of benefit period, future follow ups, and contact information if any further help or support is needed. Will resolve episode and complete smart form for Quit attempt #2 and 3 month follow up on Quit #3.      " 5 (moderate pain)

## 2023-11-27 NOTE — H&P ADULT - NSHPPHYSICALEXAM_GEN_ALL_CORE
Physical Exam:   General: sitting comfortably in bed, NAD  CV: RRR  Lungs: CTAB  Abd: Soft, non-tender, slightly distended and tympanic. Normal fundal tenderness.  :  Minimal bleeding on pad. Lochia wnl  Ext: non-tender b/l, 1+ edema to mid shin bilaterally

## 2023-11-27 NOTE — H&P ADULT - NSHPLABSRESULTS_GEN_ALL_CORE
LABS:                              12.7   11.69 )-----------( 301      ( 27 Nov 2023 12:54 )             38.2     11-27    140  |  106  |  12  ----------------------------<  74  3.9   |  22  |  0.56    Ca    9.2      27 Nov 2023 12:54  Mg     2.1     11-27    TPro  6.8  /  Alb  3.8  /  TBili  0.1<L>  /  DBili  x   /  AST  34  /  ALT  45  /  AlkPhos  135<H>  11-27    I&O's Detail    PT/INR - ( 27 Nov 2023 12:54 )   PT: 10.2 sec;   INR: 0.93 ratio         PTT - ( 27 Nov 2023 12:54 )  PTT:28.4 sec  Urinalysis Basic - ( 27 Nov 2023 12:54 )    Color: x / Appearance: x / SG: x / pH: x  Gluc: 74 mg/dL / Ketone: x  / Bili: x / Urobili: x   Blood: x / Protein: x / Nitrite: x   Leuk Esterase: x / RBC: x / WBC x   Sq Epi: x / Non Sq Epi: x / Bacteria: x

## 2023-11-27 NOTE — CHART NOTE - NSCHARTNOTEFT_GEN_A_CORE
Assessed pt at bedside once arrived to L&D from ED. Pt is overall feeling improved. Headache is now mild, c/o heaviness in head (likely 2/2 magnesium). Pt w/o vision changes, epigastric pain, RUQ pain. BPs normotensive. Pt to be transferred to postpartum floor. Continue magnesium x24hrs, continue labetalol 200 BID. Monitor BPs. AM HELLP labs ordered.    Sanju, PGY4  D/w Dr. Morales
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
## 2023-11-27 NOTE — ED ADULT NURSE REASSESSMENT NOTE - NS ED NURSE REASSESS COMMENT FT1
Pt ordered for 4g Mg, OBGYN at bedside. Pt has patent IV w/ +blood return, flushing w/o difficulty. Mg started with 2nd RN Denia. Pt educated on sings & symptoms of reactions to make RN or MD aware of, pt verbalized understanding. Pt ordered for 4g Mg, OBGYN at bedside, for postpartum HTN. Pt has patent IV w/ +blood return, flushing w/o difficulty. Mg started with 2nd RN Denia. Pt educated on sings & symptoms of medication reactions to make RN or MD aware of, pt verbalized understanding.

## 2023-11-27 NOTE — H&P ADULT - HISTORY OF PRESENT ILLNESS
R1 H&P    Pt is a 37y/o  PPD#4 from  PTD@34wk admitted for postpartum preeclampsia with severe features.   Prenatal course was complicated by PPROM@34wk with a cerclage in place. Cerclage was removed and patient delivered uncomplicated. She had an uncomplicated postpartum course and was discharged home on PPD#2. She reports that on the day of discharge she had a mild headache, which over the past two days has become more severe 8/10. It slightly improves with Motrin and then gets worse again. She also endorses photophobia, spots in her vision, and dizziness. She otherwise denies CP, SOB, N/V, RUQ pain, dysuria, constipation, fevers, chills. She is ambulating, voiding, tolerating PO, and had a bowel movement. She is still pumping and breastfeeding baby who is in the NICU.    OBHx:   -  PTD@20wk  fetal demise  - current pregnancy as above  GynHx: denies h/o STI's, fibroids, cysts. Remote hx abn paps  PMHx:  PSHx:  Med:  All: NKDA  SH: denies alcohol, tobacco, or drug use  Psych: denies h/o anxiety or depression   R1 H&P    Pt is a 39y/o  PPD#4 from [facility] PTD@34wk admitted for postpartum preeclampsia with severe features.   Prenatal course was complicated by PPROM@34wk with a cerclage in place. Cerclage was removed and patient delivered uncomplicated. She had an uncomplicated postpartum course and was discharged home on PPD#2. She reports that on the day of discharge she had a mild headache, which over the past two days has become more severe 8/10. It slightly improves with Motrin and then gets worse again. She also endorses photophobia, spots in her vision, and dizziness. She otherwise denies CP, SOB, N/V, RUQ pain, dysuria, constipation, fevers, chills. She is ambulating, voiding, tolerating PO, and had a bowel movement. She is still pumping and breastfeeding baby who is in the NICU.    OBHx:   -  PTD@20wk  fetal demise c/b retained products s/p D+C  - current pregnancy as above  GynHx: denies h/o STI's, fibroids, cysts. Remote hx abn paps  PMHx: iron deficiency anemia  PSHx: D+C, arthroscopic knee surgery  Med: Motrin, PNV, Fe  All: NKDA  SH: denies alcohol, tobacco, or drug use  Psych: denies h/o anxiety or depression

## 2023-11-27 NOTE — H&P ADULT - ATTENDING COMMENTS
39yo  readmitted on PPD4 for PEC w SF by BPs after p/w HA and floaters, BPs initially severe range in ED. Started on Mg for seizure ppx and labetalol 200mg BID PO. Continue BP monitoring and f/u labs.    Shi SCHULTE

## 2023-11-27 NOTE — ED PROVIDER NOTE - CLINICAL SUMMARY MEDICAL DECISION MAKING FREE TEXT BOX
38-year-old female G2, P1 4 days postpartum  presents with 2 days of worsening headache.  Patient also having elevated blood pressures at home measured to 180s today.  Denies any changes in vision.  Denies any nausea vomiting abdominal pain.  No head.  No change in sensation or weakness.  Patient took ibuprofen this morning with minimal relief.  Denies any chest pain shortness of breath dysuria urinary frequency.  On arrival patient hypertensive to 180s over 90s.  Other vital signs stable.  Normal neurologic exam.  On repeat vital signs patient having elevated pressures over 140/90 with headache.  Plan for labs IVF meds OB consult and likely admission. 38-year-old female G2, P1 4 days postpartum  presents with 2 days of worsening headache.  Patient also having elevated blood pressures at home measured to 180s today.  Denies any changes in vision.  Denies any nausea vomiting abdominal pain.  No head.  No change in sensation or weakness.  Patient took ibuprofen this morning with minimal relief.  Denies any chest pain shortness of breath dysuria urinary frequency.  On arrival patient hypertensive to 180s over 90s.  Other vital signs stable.  Normal neurologic exam.  On repeat vital signs patient having elevated pressures over 140/90 with headache.  Plan for labs IVF meds OB consult and likely admission.    Dr. Ascencio (Attending Physician)

## 2023-11-27 NOTE — ED ADULT NURSE NOTE - OBJECTIVE STATEMENT
39 y/o female, A&O x3,   34 weeks presents to ED c/o HTN and HA for 3 days. 37 y/o female, A&O x3,   34 weeks presents to ED c/o HTN and HA for 3 days. Pt endorses delivering  34 weeks on , pt states she was "high risk pregnancy", cerclage placed at 14 weeks. Pt endorses 3 days of headache, blurred vision, and fluctuating BP, was told to report to ED by clinic OBGYN. Upon assessment pt affect calm and appropriate, spontaneous breathing, clear lung sounds bilaterally, +1 edema bilateral ankles, strong peripheral pulses, PERRLA 3mm bilaterally, equal strength and sensation bilaterally. Pt denies chest pain, SOB/difficulty breathing, fever/chills, abd pain, N/V/D, numbness/tingling. Pt placed on cardiac monitoring. OBGYN at bedside. Safety and comfort measures maintained.

## 2023-11-27 NOTE — H&P ADULT - ASSESSMENT
A/P: Patient is a 39yo  PPD#4 from  now meeting criteria for preeclampsia with severe features by her BPs, severe headache, and visual disturbances.    #sPEC  - Start Mg for seizure PPx and then transfer patient to labor and delivery  - Start Labetalol 200BID  - monitor BPs  - f/u HELLP labs  - Can consider CT Head if HA does not improve with medications and BP control    #Postpartum state  - Continue with po analgesia  - Increase ambulation  - Continue regular diet    Elma Jung  PGY-1  d/w Dr. Morales and Dr. Olivarez PGY4

## 2023-11-27 NOTE — PATIENT PROFILE ADULT - HAVE YOU BEEN EATING POORLY BECAUSE OF A DECREASED APPETITE?
No (0) PHYSICAL EXAM:   General: well-appearing, appears stated age, not in extremis   HEENT: NC/AT, PERRLA, conjunctiva pink, airway patent  Cardiovascular: regular rate and rhythm, + S1/S2, no murmurs, rubs, gallops appreciated  Respiratory: clear to auscultation bilaterally, good aeration bilaterally, nonlabored respirations  Abdominal: +mild right CVA tenderness, soft, nontender, nondistended, no rebound, guarding or rigidity  Back: no costovertebral tenderness, no rashes noted  Extremities: no LE edema b/l. Radial pulses equal and strong b/l  Neuro: Alert and oriented x3. Moving all extremities. Ambulatory.  Psychiatric: appropriate mood and affect.   Skin: appropriate color, warm, dry.   -Nury Navarro MD Attending Physician PHYSICAL EXAM:   General: appears uncomfortable, pacing room  HEENT: NC/AT, airway patent  Cardiovascular: regular rate   Respiratory:  nonlabored respirations, saturating well on RA  Abdominal: +mild right CVA tenderness, soft, mild R anterior mid-abdominal TTP, nondistended, no rebound, guarding or rigidity  Back: no rashes noted, one suture in place over R flank from prior procedure C/D/I  Neuro: Alert and oriented x3. Moving all extremities. Ambulatory.  Psychiatric: appropriate mood and affect.   -Nury Navarro MD Attending Physician

## 2023-11-28 ENCOUNTER — TRANSCRIPTION ENCOUNTER (OUTPATIENT)
Age: 38
End: 2023-11-28

## 2023-11-28 LAB
ALBUMIN SERPL ELPH-MCNC: 3.7 G/DL — SIGNIFICANT CHANGE UP (ref 3.3–5)
ALBUMIN SERPL ELPH-MCNC: 3.7 G/DL — SIGNIFICANT CHANGE UP (ref 3.3–5)
ALP SERPL-CCNC: 133 U/L — HIGH (ref 40–120)
ALP SERPL-CCNC: 133 U/L — HIGH (ref 40–120)
ALT FLD-CCNC: 41 U/L — SIGNIFICANT CHANGE UP (ref 10–45)
ALT FLD-CCNC: 41 U/L — SIGNIFICANT CHANGE UP (ref 10–45)
ANION GAP SERPL CALC-SCNC: 8 MMOL/L — SIGNIFICANT CHANGE UP (ref 5–17)
ANION GAP SERPL CALC-SCNC: 8 MMOL/L — SIGNIFICANT CHANGE UP (ref 5–17)
APTT BLD: 28.1 SEC — SIGNIFICANT CHANGE UP (ref 24.5–35.6)
APTT BLD: 28.1 SEC — SIGNIFICANT CHANGE UP (ref 24.5–35.6)
AST SERPL-CCNC: 29 U/L — SIGNIFICANT CHANGE UP (ref 10–40)
AST SERPL-CCNC: 29 U/L — SIGNIFICANT CHANGE UP (ref 10–40)
BASOPHILS # BLD AUTO: 0.06 K/UL — SIGNIFICANT CHANGE UP (ref 0–0.2)
BASOPHILS # BLD AUTO: 0.06 K/UL — SIGNIFICANT CHANGE UP (ref 0–0.2)
BASOPHILS NFR BLD AUTO: 0.5 % — SIGNIFICANT CHANGE UP (ref 0–2)
BASOPHILS NFR BLD AUTO: 0.5 % — SIGNIFICANT CHANGE UP (ref 0–2)
BILIRUB SERPL-MCNC: 0.1 MG/DL — LOW (ref 0.2–1.2)
BILIRUB SERPL-MCNC: 0.1 MG/DL — LOW (ref 0.2–1.2)
BUN SERPL-MCNC: 10 MG/DL — SIGNIFICANT CHANGE UP (ref 7–23)
BUN SERPL-MCNC: 10 MG/DL — SIGNIFICANT CHANGE UP (ref 7–23)
CALCIUM SERPL-MCNC: 7.1 MG/DL — LOW (ref 8.4–10.5)
CALCIUM SERPL-MCNC: 7.1 MG/DL — LOW (ref 8.4–10.5)
CHLORIDE SERPL-SCNC: 106 MMOL/L — SIGNIFICANT CHANGE UP (ref 96–108)
CHLORIDE SERPL-SCNC: 106 MMOL/L — SIGNIFICANT CHANGE UP (ref 96–108)
CO2 SERPL-SCNC: 24 MMOL/L — SIGNIFICANT CHANGE UP (ref 22–31)
CO2 SERPL-SCNC: 24 MMOL/L — SIGNIFICANT CHANGE UP (ref 22–31)
CREAT SERPL-MCNC: 0.61 MG/DL — SIGNIFICANT CHANGE UP (ref 0.5–1.3)
CREAT SERPL-MCNC: 0.61 MG/DL — SIGNIFICANT CHANGE UP (ref 0.5–1.3)
EGFR: 118 ML/MIN/1.73M2 — SIGNIFICANT CHANGE UP
EGFR: 118 ML/MIN/1.73M2 — SIGNIFICANT CHANGE UP
EOSINOPHIL # BLD AUTO: 0.13 K/UL — SIGNIFICANT CHANGE UP (ref 0–0.5)
EOSINOPHIL # BLD AUTO: 0.13 K/UL — SIGNIFICANT CHANGE UP (ref 0–0.5)
EOSINOPHIL NFR BLD AUTO: 1.2 % — SIGNIFICANT CHANGE UP (ref 0–6)
EOSINOPHIL NFR BLD AUTO: 1.2 % — SIGNIFICANT CHANGE UP (ref 0–6)
FIBRINOGEN PPP-MCNC: 537 MG/DL — HIGH (ref 200–445)
FIBRINOGEN PPP-MCNC: 537 MG/DL — HIGH (ref 200–445)
GLUCOSE SERPL-MCNC: 95 MG/DL — SIGNIFICANT CHANGE UP (ref 70–99)
GLUCOSE SERPL-MCNC: 95 MG/DL — SIGNIFICANT CHANGE UP (ref 70–99)
HCT VFR BLD CALC: 37.9 % — SIGNIFICANT CHANGE UP (ref 34.5–45)
HCT VFR BLD CALC: 37.9 % — SIGNIFICANT CHANGE UP (ref 34.5–45)
HGB BLD-MCNC: 12.5 G/DL — SIGNIFICANT CHANGE UP (ref 11.5–15.5)
HGB BLD-MCNC: 12.5 G/DL — SIGNIFICANT CHANGE UP (ref 11.5–15.5)
IMM GRANULOCYTES NFR BLD AUTO: 3.2 % — HIGH (ref 0–0.9)
IMM GRANULOCYTES NFR BLD AUTO: 3.2 % — HIGH (ref 0–0.9)
INR BLD: 0.9 RATIO — SIGNIFICANT CHANGE UP (ref 0.85–1.18)
INR BLD: 0.9 RATIO — SIGNIFICANT CHANGE UP (ref 0.85–1.18)
LDH SERPL L TO P-CCNC: 289 U/L — HIGH (ref 50–242)
LDH SERPL L TO P-CCNC: 289 U/L — HIGH (ref 50–242)
LYMPHOCYTES # BLD AUTO: 2.3 K/UL — SIGNIFICANT CHANGE UP (ref 1–3.3)
LYMPHOCYTES # BLD AUTO: 2.3 K/UL — SIGNIFICANT CHANGE UP (ref 1–3.3)
LYMPHOCYTES # BLD AUTO: 20.8 % — SIGNIFICANT CHANGE UP (ref 13–44)
LYMPHOCYTES # BLD AUTO: 20.8 % — SIGNIFICANT CHANGE UP (ref 13–44)
MAGNESIUM SERPL-MCNC: 7.1 MG/DL — HIGH (ref 1.6–2.6)
MCHC RBC-ENTMCNC: 26.9 PG — LOW (ref 27–34)
MCHC RBC-ENTMCNC: 26.9 PG — LOW (ref 27–34)
MCHC RBC-ENTMCNC: 33 GM/DL — SIGNIFICANT CHANGE UP (ref 32–36)
MCHC RBC-ENTMCNC: 33 GM/DL — SIGNIFICANT CHANGE UP (ref 32–36)
MCV RBC AUTO: 81.5 FL — SIGNIFICANT CHANGE UP (ref 80–100)
MCV RBC AUTO: 81.5 FL — SIGNIFICANT CHANGE UP (ref 80–100)
MONOCYTES # BLD AUTO: 0.49 K/UL — SIGNIFICANT CHANGE UP (ref 0–0.9)
MONOCYTES # BLD AUTO: 0.49 K/UL — SIGNIFICANT CHANGE UP (ref 0–0.9)
MONOCYTES NFR BLD AUTO: 4.4 % — SIGNIFICANT CHANGE UP (ref 2–14)
MONOCYTES NFR BLD AUTO: 4.4 % — SIGNIFICANT CHANGE UP (ref 2–14)
NEUTROPHILS # BLD AUTO: 7.75 K/UL — HIGH (ref 1.8–7.4)
NEUTROPHILS # BLD AUTO: 7.75 K/UL — HIGH (ref 1.8–7.4)
NEUTROPHILS NFR BLD AUTO: 69.9 % — SIGNIFICANT CHANGE UP (ref 43–77)
NEUTROPHILS NFR BLD AUTO: 69.9 % — SIGNIFICANT CHANGE UP (ref 43–77)
NRBC # BLD: 0 /100 WBCS — SIGNIFICANT CHANGE UP (ref 0–0)
NRBC # BLD: 0 /100 WBCS — SIGNIFICANT CHANGE UP (ref 0–0)
PLATELET # BLD AUTO: 294 K/UL — SIGNIFICANT CHANGE UP (ref 150–400)
PLATELET # BLD AUTO: 294 K/UL — SIGNIFICANT CHANGE UP (ref 150–400)
POTASSIUM SERPL-MCNC: 3.9 MMOL/L — SIGNIFICANT CHANGE UP (ref 3.5–5.3)
POTASSIUM SERPL-MCNC: 3.9 MMOL/L — SIGNIFICANT CHANGE UP (ref 3.5–5.3)
POTASSIUM SERPL-SCNC: 3.9 MMOL/L — SIGNIFICANT CHANGE UP (ref 3.5–5.3)
POTASSIUM SERPL-SCNC: 3.9 MMOL/L — SIGNIFICANT CHANGE UP (ref 3.5–5.3)
PROT SERPL-MCNC: 6.7 G/DL — SIGNIFICANT CHANGE UP (ref 6–8.3)
PROT SERPL-MCNC: 6.7 G/DL — SIGNIFICANT CHANGE UP (ref 6–8.3)
PROTHROM AB SERPL-ACNC: 9.9 SEC — SIGNIFICANT CHANGE UP (ref 9.5–13)
PROTHROM AB SERPL-ACNC: 9.9 SEC — SIGNIFICANT CHANGE UP (ref 9.5–13)
RBC # BLD: 4.65 M/UL — SIGNIFICANT CHANGE UP (ref 3.8–5.2)
RBC # BLD: 4.65 M/UL — SIGNIFICANT CHANGE UP (ref 3.8–5.2)
RBC # FLD: 14.8 % — HIGH (ref 10.3–14.5)
RBC # FLD: 14.8 % — HIGH (ref 10.3–14.5)
SODIUM SERPL-SCNC: 138 MMOL/L — SIGNIFICANT CHANGE UP (ref 135–145)
SODIUM SERPL-SCNC: 138 MMOL/L — SIGNIFICANT CHANGE UP (ref 135–145)
URATE SERPL-MCNC: 5.9 MG/DL — SIGNIFICANT CHANGE UP (ref 2.5–7)
URATE SERPL-MCNC: 5.9 MG/DL — SIGNIFICANT CHANGE UP (ref 2.5–7)
WBC # BLD: 11.08 K/UL — HIGH (ref 3.8–10.5)
WBC # BLD: 11.08 K/UL — HIGH (ref 3.8–10.5)
WBC # FLD AUTO: 11.08 K/UL — HIGH (ref 3.8–10.5)
WBC # FLD AUTO: 11.08 K/UL — HIGH (ref 3.8–10.5)

## 2023-11-28 PROCEDURE — 99232 SBSQ HOSP IP/OBS MODERATE 35: CPT | Mod: GC

## 2023-11-28 RX ORDER — LABETALOL HCL 100 MG
1 TABLET ORAL
Qty: 60 | Refills: 0
Start: 2023-11-28 | End: 2023-12-27

## 2023-11-28 RX ADMIN — Medication 600 MILLIGRAM(S): at 06:29

## 2023-11-28 RX ADMIN — Medication 1 TABLET(S): at 12:55

## 2023-11-28 RX ADMIN — Medication 600 MILLIGRAM(S): at 12:55

## 2023-11-28 RX ADMIN — Medication 200 MILLIGRAM(S): at 14:38

## 2023-11-28 RX ADMIN — Medication 975 MILLIGRAM(S): at 03:30

## 2023-11-28 RX ADMIN — Medication 600 MILLIGRAM(S): at 13:51

## 2023-11-28 RX ADMIN — Medication 200 MILLIGRAM(S): at 00:56

## 2023-11-28 RX ADMIN — Medication 600 MILLIGRAM(S): at 23:28

## 2023-11-28 RX ADMIN — Medication 975 MILLIGRAM(S): at 08:37

## 2023-11-28 RX ADMIN — Medication 975 MILLIGRAM(S): at 14:54

## 2023-11-28 RX ADMIN — Medication 975 MILLIGRAM(S): at 02:49

## 2023-11-28 RX ADMIN — Medication 975 MILLIGRAM(S): at 22:01

## 2023-11-28 RX ADMIN — HEPARIN SODIUM 5000 UNIT(S): 5000 INJECTION INTRAVENOUS; SUBCUTANEOUS at 18:53

## 2023-11-28 RX ADMIN — HEPARIN SODIUM 5000 UNIT(S): 5000 INJECTION INTRAVENOUS; SUBCUTANEOUS at 06:02

## 2023-11-28 RX ADMIN — Medication 975 MILLIGRAM(S): at 09:30

## 2023-11-28 RX ADMIN — Medication 975 MILLIGRAM(S): at 15:50

## 2023-11-28 RX ADMIN — Medication 600 MILLIGRAM(S): at 19:50

## 2023-11-28 RX ADMIN — Medication 600 MILLIGRAM(S): at 18:53

## 2023-11-28 RX ADMIN — Medication 600 MILLIGRAM(S): at 06:02

## 2023-11-28 RX ADMIN — Medication 975 MILLIGRAM(S): at 21:25

## 2023-11-28 NOTE — DISCHARGE NOTE PROVIDER - NSDCMRMEDTOKEN_GEN_ALL_CORE_FT
labetalol 200 mg oral tablet: 1 tab(s) orally every 12 hours If you had high blood pressure during your pregnancy, during labor, or after delivery, please follow up with your OB/Gyn in 2-3 days for a BP check.    Continue to monitor your blood pressures three times daily at home (before breakfast, lunch, and dinner).  If any blood pressures are GREATER than 160/110, or if you experience changes in your vision, headache not improved with tylenol, persistent nausea, vomiting and/or right upper abdominal pain, present to ED for further evaluation.    If you are taking blood pressure medication, take your blood pressure before taking your medication. If LESS [id number] systolic (top number) and/or LESS than 60 diastolic (bottom number), do not take your medication.  Prenatal Multivitamins with Folic Acid 1 mg oral tablet: 1 tab(s) orally once a day

## 2023-11-28 NOTE — DISCHARGE NOTE PROVIDER - NSDCACTIVITY_GEN_ALL_CORE
September 3, 2019        Mary Raquel  [address] [place] [id number]          To whom it may concern:    This patient missed school 9/3/2019 because he was hospitalized. He is able to return to school on 9/4/2019. Please excuse his absence from school and contact me for questions or concerns.        Sincerely,        Brenda Mayo MD      Follow Instructions Provided by your Surgical Team

## 2023-11-28 NOTE — DISCHARGE NOTE PROVIDER - CARE PROVIDER_API CALL
[facility],   [address] [place] [address] [address]    [place] [address]  Phone: (   )    -  Fax: (   )    -  Follow Up Time: 1 week

## 2023-11-28 NOTE — DISCHARGE NOTE PROVIDER - PROVIDER TOKENS
FREE:[LAST:[[facility]],PHONE:[(   )    -],FAX:[(   )    -],ADDRESS:[[address] [place] [address]],FOLLOWUP:[1 week]]

## 2023-11-28 NOTE — PROGRESS NOTE ADULT - ATTENDING COMMENTS
back note (seen ~ 930 am)  pp readmit for PEC w severe features  on magnesium and labetalol  no HA, CP, SOB    baby doing well, waiting for x-ray for  rib    ICU Vital Signs Last 24 Hrs  T(C): 37.1 (2023 14:30), Max: 37.1 (2023 16:15)  HR: 76 (2023 14:30) (67 - 87)  BP: 112/73 (2023 14:30) (95/58 - 120/79)  BP(mean): 92 (2023 16:15) (85 - 93)  RR: 18 (2023 12:10) (18 - 18)  SpO2: 98% (2023 12:10) (97% - 100%)    O2 Parameters below as of 2023 12:10  Patient On (Oxygen Delivery Method): room air                          12.5   11.08 )-----------( 294      ( 2023 03:05 )             37.9                    138  |  106  |  10  ----------------------------<  95  3.9   |  24  |  0.61    Ca    7.1<L>      2023 03:11  Mg     7.1         TPro  6.7  /  Alb  3.7  /  TBili  0.1<L>  /  DBili  x   /  AST  29  /  ALT  41  /  AlkPhos  133<H>        PPD # 5 PEC  stable   s/p mag  BP controlled   plan discharge home in PM    GLORIA Ross MD

## 2023-11-28 NOTE — LACTATION INITIAL EVALUATION - LACTATION INTERVENTIONS
mom readmittied for PEC Mom has been pumping about 5 times per day, Mom reports having difficulty waking for night pump sessions. discussed possible strategies  to meet goal of 8 times per day Mom plans to try to pump at irregular frequencies. Mom has all supplies./initiate/review safe skin-to-skin/initiate/review hand expression/initiate/review pumping guidelines and safe milk handling/initiate/review techniques for position and latch/review techniques to increase milk supply/reviewed risks of unnecessary formula supplementation

## 2023-11-28 NOTE — DISCHARGE NOTE PROVIDER - NSDCFUSCHEDAPPT_GEN_ALL_CORE_FT
[facility]  MATERNAL  David  Scheduled Appointment: 12/05/2023    [facility]  ANTEPARTUM [address]  Scheduled Appointment: 12/12/2023    José Luis Hernandez  [facility]  Scheduled Appointment: 01/03/2024

## 2023-11-28 NOTE — DISCHARGE NOTE PROVIDER - NSDCFUADDINST_GEN_ALL_CORE_FT
If you had high blood pressure during your pregnancy, during labor, or after delivery, please follow up with your OB/Gyn in 2-3 days for a BP check.    Continue to monitor your blood pressures three times daily at home (before breakfast, lunch, and dinner).  If any blood pressures are GREATER than 160/110, or if you experience changes in your vision, headache not improved with tylenol, persistent nausea, vomiting and/or right upper abdominal pain, present to ED for further evaluation.    If you are taking blood pressure medication, take your blood pressure before taking your medication. If LESS [id number] systolic (top number) and/or LESS than 60 diastolic (bottom number), do not take your medication.     After discharge, please stay on pelvic rest for 6 weeks, meaning no sexual intercourse, no tampons and no douching.  No driving for 2 weeks as women can loose a lot of blood during delivery and there is a possibility of being lightheaded/fainting.  No lifting objects heavier than baby for two weeks.  Expect to have vaginal bleeding/spotting for up to six weeks.  The bleeding should get lighter and more white/light brown with time.  For bleeding soaking more than a pad an hour or passing clots greater than the size of your fist, come in to the emergency department.    Follow up in clinic in 1-2 weeks.

## 2023-11-29 ENCOUNTER — TRANSCRIPTION ENCOUNTER (OUTPATIENT)
Age: 38
End: 2023-11-29

## 2023-11-29 VITALS
TEMPERATURE: 99 F | SYSTOLIC BLOOD PRESSURE: 136 MMHG | HEART RATE: 74 BPM | OXYGEN SATURATION: 98 % | RESPIRATION RATE: 18 BRPM | DIASTOLIC BLOOD PRESSURE: 90 MMHG

## 2023-11-29 PROBLEM — O03.9 COMPLETE OR UNSPECIFIED SPONTANEOUS ABORTION WITHOUT COMPLICATION: Chronic | Status: ACTIVE | Noted: 2023-11-27

## 2023-11-29 PROCEDURE — 85384 FIBRINOGEN ACTIVITY: CPT

## 2023-11-29 PROCEDURE — 99285 EMERGENCY DEPT VISIT HI MDM: CPT | Mod: 25

## 2023-11-29 PROCEDURE — 84156 ASSAY OF PROTEIN URINE: CPT

## 2023-11-29 PROCEDURE — 85610 PROTHROMBIN TIME: CPT

## 2023-11-29 PROCEDURE — 80053 COMPREHEN METABOLIC PANEL: CPT

## 2023-11-29 PROCEDURE — 84550 ASSAY OF BLOOD/URIC ACID: CPT

## 2023-11-29 PROCEDURE — 36415 COLL VENOUS BLD VENIPUNCTURE: CPT

## 2023-11-29 PROCEDURE — 82570 ASSAY OF URINE CREATININE: CPT

## 2023-11-29 PROCEDURE — 83735 ASSAY OF MAGNESIUM: CPT

## 2023-11-29 PROCEDURE — 96375 TX/PRO/DX INJ NEW DRUG ADDON: CPT

## 2023-11-29 PROCEDURE — 81001 URINALYSIS AUTO W/SCOPE: CPT

## 2023-11-29 PROCEDURE — 83880 ASSAY OF NATRIURETIC PEPTIDE: CPT

## 2023-11-29 PROCEDURE — 70450 CT HEAD/BRAIN W/O DYE: CPT

## 2023-11-29 PROCEDURE — 85730 THROMBOPLASTIN TIME PARTIAL: CPT

## 2023-11-29 PROCEDURE — 83615 LACTATE (LD) (LDH) ENZYME: CPT

## 2023-11-29 PROCEDURE — 96374 THER/PROPH/DIAG INJ IV PUSH: CPT

## 2023-11-29 PROCEDURE — 85025 COMPLETE CBC W/AUTO DIFF WBC: CPT

## 2023-11-29 RX ADMIN — Medication 200 MILLIGRAM(S): at 02:17

## 2023-11-29 RX ADMIN — Medication 600 MILLIGRAM(S): at 14:00

## 2023-11-29 RX ADMIN — Medication 975 MILLIGRAM(S): at 08:52

## 2023-11-29 RX ADMIN — Medication 600 MILLIGRAM(S): at 00:20

## 2023-11-29 RX ADMIN — Medication 975 MILLIGRAM(S): at 15:33

## 2023-11-29 RX ADMIN — HEPARIN SODIUM 5000 UNIT(S): 5000 INJECTION INTRAVENOUS; SUBCUTANEOUS at 08:52

## 2023-11-29 RX ADMIN — Medication 975 MILLIGRAM(S): at 14:23

## 2023-11-29 RX ADMIN — Medication 1 TABLET(S): at 13:02

## 2023-11-29 RX ADMIN — Medication 975 MILLIGRAM(S): at 09:33

## 2023-11-29 RX ADMIN — Medication 600 MILLIGRAM(S): at 06:02

## 2023-11-29 RX ADMIN — Medication 975 MILLIGRAM(S): at 02:16

## 2023-11-29 RX ADMIN — Medication 200 MILLIGRAM(S): at 14:22

## 2023-11-29 RX ADMIN — Medication 975 MILLIGRAM(S): at 03:00

## 2023-11-29 RX ADMIN — Medication 600 MILLIGRAM(S): at 13:02

## 2023-11-29 RX ADMIN — Medication 600 MILLIGRAM(S): at 05:35

## 2023-11-29 NOTE — DISCHARGE NOTE NURSING/CASE MANAGEMENT/SOCIAL WORK - PATIENT PORTAL LINK FT
You can access the FollowMyHealth Patient Portal offered by [facility] by registering at the following website: [email or web address]. By joining [facility]’s FollowMyHealth portal, you will also be able to view your health information using other applications (apps) compatible with our system.

## 2023-11-29 NOTE — PROGRESS NOTE ADULT - ASSESSMENT
37yo  PPD#5  from  admitted for postpartum severe preeclampsia.     #sPEC  - c/w Mg for seizure PPx x24h  - c/w Labetalol 200BID  - monitor BPs  - HELLP labs wnl  - f/u CT head    #Postpartum state  - MOtrin, Tylenol PRN  - Increase ambulation  - Continue regular diet    RAZA Chavez-Jordan PGY3 
37yo  PPD#5 from  admitted for postpartum severe preeclampsia.     #sPEC  - s/p Mg for seizure PPx x24h  - c/w Labetalol 200BID  - monitor BPs  - HELLP labs wnl  - CT head wnl     #Postpartum state  - MOtrin, Tylenol PRN  - Increase ambulation  - Continue regular diet    RAZA Chavez-Jordan PGY3

## 2023-11-29 NOTE — PROGRESS NOTE ADULT - ATTENDING COMMENTS
Obstetrical  8am-6pm:  Patient seen and examined by me.  Agree with above resident note. To be discharged on medication. Given information re: BP evaluations and when to call us. Patient expressed understanding of the concerns. For office visit 1 wk.   Joce SCHULTE

## 2023-11-29 NOTE — PROGRESS NOTE ADULT - SUBJECTIVE AND OBJECTIVE BOX
Postpartum Note     Interval events:    Patient seen and examined at bedside, no acute overnight events. No acute complaints. Denies HA, epigastric pain, blurred vision, CP, SOB, N/V, fevers, and chills.    Vital Signs Last 24 Hours  T(C): 36.8 (11-28-23 @ 23:55), Max: 37.2 (11-28-23 @ 21:57)  HR: 71 (11-28-23 @ 23:55) (68 - 87)  BP: 118/74 (11-28-23 @ 23:55) (95/58 - 118/74)  RR: 18 (11-28-23 @ 23:55) (18 - 18)  SpO2: 97% (11-28-23 @ 23:55) (96% - 100%)    Physical Exam:  General: NAD  Abdomen: Soft, non-tender, fundus firm  Ext: No pain or swelling    Labs:             12.5   11.08 )-----------( 294      ( 11-28 @ 03:05 )             37.9     11-28 @ 03:11    138  |  106  |  10  ----------------------------<  95  3.9   |  24  |  0.61    Ca    7.1      11-28 @ 03:11  Mg     7.1     11-28 @ 13:03    TPro  6.7  /  Alb  3.7  /  TBili  0.1  /  DBili  x   /  AST  29  /  ALT  41  /  AlkPhos  133  11-28 @ 03:11    PT/INR - ( 11-28 @ 03:05 )   PT: 9.9 sec;   INR: 0.90 ratio    PTT - ( 11-28 @ 03:05 )  PTT:28.1 sec    Uric Acid: (11-28 @ 03:11)  5.9      Fibrinogen: (11-28 @ 03:11)  --       LDH: (11-28 @ 03:11)  289        MEDICATIONS  (STANDING):  acetaminophen     Tablet .. 975 milliGRAM(s) Oral every 6 hours  heparin   Injectable 5000 Unit(s) SubCutaneous every 12 hours  ibuprofen  Tablet. 600 milliGRAM(s) Oral every 6 hours  influenza   Vaccine 0.5 milliLiter(s) IntraMuscular once  labetalol 200 milliGRAM(s) Oral every 12 hours  lactated ringers. 1000 milliLiter(s) (50 mL/Hr) IV Continuous <Continuous>  prenatal multivitamin 1 Tablet(s) Oral daily    MEDICATIONS  (PRN):  
Postpartum Note, HD#1    Interval events:    Patient seen and examined at bedside, no acute overnight events. No acute complaints. SANTOS improved. Deneis  epigastric pain, blurred vision, CP, SOB, N/V, fevers, and chills.    Vital Signs Last 24 Hours  T(C): 36.6 (11-28-23 @ 02:53), Max: 37.4 (11-27-23 @ 12:22)  HR: 67 (11-28-23 @ 02:53) (63 - 83)  BP: 116/74 (11-28-23 @ 02:53) (100/65 - 181/81)  RR: 18 (11-28-23 @ 02:53) (18 - 26)  SpO2: 100% (11-28-23 @ 02:53) (98% - 100%)    Physical Exam:  General: NAD  Abdomen: Soft, non-tender, fundus firm  Ext: No pain or swelling    Labs:             12.5   11.08 )-----------( 294      ( 11-28 @ 03:05 )             37.9     11-28 @ 03:11    138  |  106  |  10  ----------------------------<  95  3.9   |  24  |  0.61    Ca    7.1      11-28 @ 03:11  Mg     7.1     11-28 @ 03:11    TPro  6.7  /  Alb  3.7  /  TBili  0.1  /  DBili  x   /  AST  29  /  ALT  41  /  AlkPhos  133  11-28 @ 03:11    PT/INR - ( 11-28 @ 03:05 )   PT: 9.9 sec;   INR: 0.90 ratio    PTT - ( 11-28 @ 03:05 )  PTT:28.1 sec    Uric Acid: (11-28 @ 03:11)  5.9      Fibrinogen: (11-28 @ 03:11)  --       LDH: (11-28 @ 03:11)  289        MEDICATIONS  (STANDING):  acetaminophen     Tablet .. 975 milliGRAM(s) Oral every 6 hours  heparin   Injectable 5000 Unit(s) SubCutaneous every 12 hours  ibuprofen  Tablet. 600 milliGRAM(s) Oral every 6 hours  influenza   Vaccine 0.5 milliLiter(s) IntraMuscular once  labetalol 200 milliGRAM(s) Oral every 12 hours  lactated ringers. 1000 milliLiter(s) (50 mL/Hr) IV Continuous <Continuous>  prenatal multivitamin 1 Tablet(s) Oral daily    MEDICATIONS  (PRN):

## 2023-11-29 NOTE — DISCHARGE NOTE NURSING/CASE MANAGEMENT/SOCIAL WORK - HAS THE PATIENT RECEIVED THE INFLUENZA VACCINE THIS SEASON?
[No Acute Distress] : no acute distress [Well Nourished] : well nourished [Well Developed] : well developed [Well-Appearing] : well-appearing [Normal Sclera/Conjunctiva] : normal sclera/conjunctiva [PERRL] : pupils equal round and reactive to light [EOMI] : extraocular movements intact no... [Normal Outer Ear/Nose] : the outer ears and nose were normal in appearance [Normal Oropharynx] : the oropharynx was normal [No JVD] : no jugular venous distention [No Lymphadenopathy] : no lymphadenopathy [Thyroid Normal, No Nodules] : the thyroid was normal and there were no nodules present [No Respiratory Distress] : no respiratory distress  [Clear to Auscultation] : lungs were clear to auscultation bilaterally [No Accessory Muscle Use] : no accessory muscle use [Normal Rate] : normal rate  [Regular Rhythm] : with a regular rhythm [Normal S1, S2] : normal S1 and S2 [No Carotid Bruits] : no carotid bruits [No Abdominal Bruit] : a ~M bruit was not heard ~T in the abdomen [No Varicosities] : no varicosities [Pedal Pulses Present] : the pedal pulses are present [No Extremity Clubbing/Cyanosis] : no extremity clubbing/cyanosis [No Palpable Aorta] : no palpable aorta [Soft] : abdomen soft [Non Tender] : non-tender [Non-distended] : non-distended [No Masses] : no abdominal mass palpated [No HSM] : no HSM [Normal Bowel Sounds] : normal bowel sounds [Normal Supraclavicular Nodes] : no supraclavicular lymphadenopathy [Normal Posterior Cervical Nodes] : no posterior cervical lymphadenopathy [Normal Anterior Cervical Nodes] : no anterior cervical lymphadenopathy [No CVA Tenderness] : no CVA  tenderness [No Spinal Tenderness] : no spinal tenderness [Grossly Normal Strength/Tone] : grossly normal strength/tone [No Skin Lesions] : no skin lesions [Normal Gait] : normal gait [Coordination Grossly Intact] : coordination grossly intact [No Focal Deficits] : no focal deficits [Normal Affect] : the affect was normal [Alert and Oriented x3] : oriented to person, place, and time [Comprehensive Foot Exam Normal] : Right and left foot were examined and both feet are normal. No ulcers in either foot. Toes are normal and with full ROM.  Normal tactile sensation with monofilament testing throughout both feet [de-identified] : wears hearing aides bilateral  R canal ok.  L canal deep dry impaction [de-identified] : no rhonchi [de-identified] : 2/6 JEOVANY [de-identified] : 2+ edema   [de-identified] :   Lg ventral hernia Colostomy   [de-identified] : R arm atrophy and less swelling

## 2023-11-29 NOTE — DISCHARGE NOTE NURSING/CASE MANAGEMENT/SOCIAL WORK - NSDCPNINST_GEN_ALL_CORE
Follow up with MD as instructed.  Call MD if BP greater than 160/100, continued headaches, blurry vision, dizziness or any other questions or concerns.

## 2023-11-29 NOTE — DISCHARGE NOTE NURSING/CASE MANAGEMENT/SOCIAL WORK - NSDCPEFALRISK_GEN_ALL_CORE
For information on Fall & Injury Prevention, visit: [email or web address]  [email or web address] OR  https://www.cdc.gov/steadi/patient.html

## 2023-12-05 ENCOUNTER — APPOINTMENT (OUTPATIENT)
Dept: MATERNAL FETAL MEDICINE | Facility: CLINIC | Age: 38
End: 2023-12-05

## 2023-12-05 ENCOUNTER — APPOINTMENT (OUTPATIENT)
Dept: MATERNAL FETAL MEDICINE | Facility: CLINIC | Age: 38
End: 2023-12-05
Payer: MEDICAID

## 2023-12-05 VITALS
OXYGEN SATURATION: 98 % | DIASTOLIC BLOOD PRESSURE: 70 MMHG | TEMPERATURE: 99 F | HEIGHT: 58 IN | HEART RATE: 72 BPM | SYSTOLIC BLOOD PRESSURE: 102 MMHG

## 2023-12-05 DIAGNOSIS — O34.30 MATERNAL CARE FOR CERVICAL INCOMPETENCE, UNSPECIFIED TRIMESTER: ICD-10-CM

## 2023-12-05 DIAGNOSIS — O09.90 SUPERVISION OF HIGH RISK PREGNANCY, UNSPECIFIED, UNSPECIFIED TRIMESTER: ICD-10-CM

## 2023-12-05 PROCEDURE — 99213 OFFICE O/P EST LOW 20 MIN: CPT | Mod: TH,GE

## 2023-12-07 ENCOUNTER — NON-APPOINTMENT (OUTPATIENT)
Age: 38
End: 2023-12-07

## 2023-12-12 ENCOUNTER — APPOINTMENT (OUTPATIENT)
Dept: ANTEPARTUM | Facility: CLINIC | Age: 38
End: 2023-12-12

## 2023-12-15 ENCOUNTER — APPOINTMENT (OUTPATIENT)
Dept: MATERNAL FETAL MEDICINE | Facility: CLINIC | Age: 38
End: 2023-12-15

## 2023-12-18 ENCOUNTER — NON-APPOINTMENT (OUTPATIENT)
Age: 38
End: 2023-12-18

## 2024-01-05 ENCOUNTER — OUTPATIENT (OUTPATIENT)
Dept: OUTPATIENT SERVICES | Facility: HOSPITAL | Age: 39
LOS: 1 days | End: 2024-01-05
Payer: MEDICAID

## 2024-01-05 ENCOUNTER — LABORATORY RESULT (OUTPATIENT)
Age: 39
End: 2024-01-05

## 2024-01-05 ENCOUNTER — APPOINTMENT (OUTPATIENT)
Dept: MATERNAL FETAL MEDICINE | Facility: CLINIC | Age: 39
End: 2024-01-05
Payer: MEDICAID

## 2024-01-05 VITALS
DIASTOLIC BLOOD PRESSURE: 78 MMHG | HEIGHT: 58 IN | SYSTOLIC BLOOD PRESSURE: 118 MMHG | OXYGEN SATURATION: 98 % | HEART RATE: 95 BPM | WEIGHT: 147.38 LBS | BODY MASS INDEX: 30.94 KG/M2

## 2024-01-05 DIAGNOSIS — Z98.890 OTHER SPECIFIED POSTPROCEDURAL STATES: Chronic | ICD-10-CM

## 2024-01-05 DIAGNOSIS — O09.90 SUPERVISION OF HIGH RISK PREGNANCY, UNSPECIFIED, UNSPECIFIED TRIMESTER: ICD-10-CM

## 2024-01-05 PROCEDURE — 87624 HPV HI-RISK TYP POOLED RSLT: CPT

## 2024-01-05 PROCEDURE — G0463: CPT

## 2024-01-05 PROCEDURE — 99212 OFFICE O/P EST SF 10 MIN: CPT | Mod: TH,GE

## 2024-01-08 ENCOUNTER — NON-APPOINTMENT (OUTPATIENT)
Age: 39
End: 2024-01-08

## 2024-01-08 LAB
HPV HIGH+LOW RISK DNA PNL CVX: SIGNIFICANT CHANGE UP
HPV HIGH+LOW RISK DNA PNL CVX: SIGNIFICANT CHANGE UP

## 2024-01-09 LAB
CYTOLOGY SPEC DOC CYTO: SIGNIFICANT CHANGE UP
CYTOLOGY SPEC DOC CYTO: SIGNIFICANT CHANGE UP

## 2024-01-25 ENCOUNTER — NON-APPOINTMENT (OUTPATIENT)
Age: 39
End: 2024-01-25

## 2024-04-13 LAB — SURGICAL PATHOLOGY STUDY: SIGNIFICANT CHANGE UP

## 2025-05-05 NOTE — DISCHARGE NOTE NURSING/CASE MANAGEMENT/SOCIAL WORK - NSPROMEDSBROUGHTTOHOSP_GEN_A_NUR
no FAMILY HISTORY:  Mother  Still living? Unknown  FH: HTN (hypertension), Age at diagnosis: Age Unknown    Sibling  Still living? Unknown  FH: type 2 diabetes mellitus, Age at diagnosis: Age Unknown